# Patient Record
Sex: FEMALE | Race: BLACK OR AFRICAN AMERICAN
[De-identification: names, ages, dates, MRNs, and addresses within clinical notes are randomized per-mention and may not be internally consistent; named-entity substitution may affect disease eponyms.]

---

## 2017-02-16 ENCOUNTER — HOSPITAL ENCOUNTER (EMERGENCY)
Dept: HOSPITAL 61 - ER | Age: 77
Discharge: HOME | End: 2017-02-16
Payer: MEDICARE

## 2017-02-16 VITALS — BODY MASS INDEX: 35.68 KG/M2 | HEIGHT: 64 IN | WEIGHT: 209 LBS

## 2017-02-16 VITALS — DIASTOLIC BLOOD PRESSURE: 69 MMHG | SYSTOLIC BLOOD PRESSURE: 177 MMHG

## 2017-02-16 DIAGNOSIS — Z88.8: ICD-10-CM

## 2017-02-16 DIAGNOSIS — N18.4: ICD-10-CM

## 2017-02-16 DIAGNOSIS — Z88.6: ICD-10-CM

## 2017-02-16 DIAGNOSIS — I12.9: ICD-10-CM

## 2017-02-16 DIAGNOSIS — E78.00: ICD-10-CM

## 2017-02-16 DIAGNOSIS — D64.9: ICD-10-CM

## 2017-02-16 DIAGNOSIS — M17.0: Primary | ICD-10-CM

## 2017-02-16 LAB
ANION GAP SERPL CALC-SCNC: 10 MMOL/L (ref 6–14)
BASOPHILS # BLD AUTO: 0 X10^3/UL (ref 0–0.2)
BASOPHILS NFR BLD: 1 % (ref 0–3)
BUN SERPL-MCNC: 92 MG/DL (ref 7–20)
CALCIUM SERPL-MCNC: 9 MG/DL (ref 8.5–10.1)
CHLORIDE SERPL-SCNC: 108 MMOL/L (ref 98–107)
CO2 SERPL-SCNC: 29 MMOL/L (ref 21–32)
CREAT SERPL-MCNC: 3.9 MG/DL (ref 0.6–1)
EOSINOPHIL NFR BLD: 5 % (ref 0–3)
ERYTHROCYTE [DISTWIDTH] IN BLOOD BY AUTOMATED COUNT: 14.5 % (ref 11.5–14.5)
GFR SERPLBLD BASED ON 1.73 SQ M-ARVRAT: 13.5 ML/MIN
GLUCOSE SERPL-MCNC: 107 MG/DL (ref 70–99)
HCT VFR BLD CALC: 26.3 % (ref 36–47)
HGB BLD-MCNC: 8.3 G/DL (ref 12–15.5)
LYMPHOCYTES # BLD: 1.8 X10^3/UL (ref 1–4.8)
LYMPHOCYTES NFR BLD AUTO: 21 % (ref 24–48)
MCH RBC QN AUTO: 31 PG (ref 25–35)
MCHC RBC AUTO-ENTMCNC: 32 G/DL (ref 31–37)
MCV RBC AUTO: 99 FL (ref 79–100)
MONOCYTES NFR BLD: 6 % (ref 0–9)
NEUTROPHILS NFR BLD AUTO: 67 % (ref 31–73)
PLATELET # BLD AUTO: 244 X10^3/UL (ref 140–400)
POTASSIUM SERPL-SCNC: 5.2 MMOL/L (ref 3.5–5.1)
RBC # BLD AUTO: 2.67 X10^6/UL (ref 3.5–5.4)
SODIUM SERPL-SCNC: 147 MMOL/L (ref 136–145)
WBC # BLD AUTO: 8.4 X10^3/UL (ref 4–11)

## 2017-02-16 PROCEDURE — 85027 COMPLETE CBC AUTOMATED: CPT

## 2017-02-16 PROCEDURE — 36415 COLL VENOUS BLD VENIPUNCTURE: CPT

## 2017-02-16 PROCEDURE — 93005 ELECTROCARDIOGRAM TRACING: CPT

## 2017-02-16 PROCEDURE — 80048 BASIC METABOLIC PNL TOTAL CA: CPT

## 2017-02-16 NOTE — PHYS DOC
Past Medical History


Past Medical History:  Arthritis, High Cholesterol, Hypertension


Additional Past Medical Histor:  KIDNEY DISEASE


Past Surgical History:  Hysterectomy


Alcohol Use:  Occasionally


Drug Use:  None





Adult General


Chief Complaint


Chief Complaint:  LOWER EXTREMITY SWELLING





[facility]


HPI


Patient is a 76  year old female who presents with primary complaint of 

bilateral knee swelling and pain. Patient states that she has history of 

arthritis. Patient has been having worsening pain over the past 2 weeks. 

Patient states that she has an appointment with her orthopedic doctor, Dr. Mendez, 

a [facility] [place] in 8 days but stated that due to worsening symptoms she 

could not wait until her appointment. Patient is currently taking Tylenol 3 for 

her symptoms with no relief. Patient rates her pain as 8 out of 10 when 

walking. Patient states that the symptoms improve with rest. Patient has 

history of chronic kidney disease and anemia. The patient stated that she 

wanted to make sure she was not having any problems from her condition that 

could be causing her swelling. Patient has not had any exertional dyspnea, 

orthopnea, or chest pain associated with her symptoms.





Review of Systems


Review of Systems





Constitutional: Denies fever or chills []


Eyes: Denies change in visual acuity, redness, or eye pain []


HENT: Denies nasal congestion or sore throat []


Respiratory: Denies cough or shortness of breath []


Cardiovascular: No additional information not addressed in HPI []


GI: Denies abdominal pain, nausea, vomiting, bloody stools or diarrhea []


: Denies dysuria or hematuria []


Musculoskeletal: Knee swelling bilaterally []


Integument: Denies rash or skin lesions []


Neurologic: Denies headache, focal weakness or sensory changes []





Allergies


Allergies





 Allergies








Coded Allergies Type Severity Reaction Last Updated Verified


 


  carvedilol Allergy Intermediate  2/16/15 Yes


 


  NSAIDS (Non-Steroidal Anti-Inflamma Allergy Unknown  11/15/16 Yes











Physical Exam


Physical Exam





Constitutional: Alert, afebrile, no acute distress. []


HENT: Normocephalic, atraumatic, bilateral external ears normal, oropharynx 

moist, no oral exudates, nose normal. []


Eyes: PERRLA, EOMI, conjunctiva normal, no discharge. [] 


Neck: Normal range of motion, no tenderness, supple, no stridor. [] 


Cardiovascular:Heart rate regular rhythm, no murmur []


Lungs & Thorax:  Bilateral breath sounds clear to auscultation []


Abdomen: Bowel sounds normal, soft, no tenderness, no masses, no pulsatile 

masses. [] 


Skin: Warm, dry, no erythema, no rash. [] 


Back: No tenderness, no CVA tenderness. [] 


Extremities: Mild to moderate soft tissue swelling along inferior joint space 

of bilateral knees, trace pedal edema bilaterally, no cyanosis, no clubbing, 

ROM intact. [] 


Neurologic: Alert and oriented X 3, normal motor function, normal sensory 

function, no focal deficits noted. []





Current Patient Data


Vital Signs





 Vital Signs








  Date Time  Temp Pulse Resp B/P Pulse Ox O2 Delivery O2 Flow Rate FiO2


 


2/16/17 22:42  46 20 177/69 93 Room Air  


 


2/16/17 20:13 98.3       





 98.3       








Lab Values





 Laboratory Tests








Test


  2/16/17


20:30


 


White Blood Count


  8.4x10^3/uL


(4.0-11.0)


 


Red Blood Count


  2.67x10^6/uL


(3.50-5.40)  L


 


Hemoglobin


  8.3g/dL


(12.0-15.5)  L


 


Hematocrit


  26.3%


(36.0-47.0)  L


 


Mean Corpuscular Volume 99fL ()  


 


Mean Corpuscular Hemoglobin 31pg (25-35)  


 


Mean Corpuscular Hemoglobin


Concent 32g/dL (31-37)


 


 


Red Cell Distribution Width


  14.5%


(11.5-14.5)


 


Platelet Count


  244x10^3/uL


(140-400)


 


Neutrophils (%) (Auto) 67% (31-73)  


 


Lymphocytes (%) (Auto) 21% (24-48)  L


 


Monocytes (%) (Auto) 6% (0-9)  


 


Eosinophils (%) (Auto) 5% (0-3)  H


 


Basophils (%) (Auto) 1% (0-3)  


 


Neutrophils # (Auto)


  5.6x10^3uL


(1.8-7.7)


 


Lymphocytes # (Auto)


  1.8x10^3/uL


(1.0-4.8)


 


Monocytes # (Auto)


  0.5x10^3/uL


(0.0-1.1)


 


Eosinophils # (Auto)


  0.4x10^3/uL


(0.0-0.7)


 


Basophils # (Auto)


  0.0x10^3/uL


(0.0-0.2)


 


Sodium Level


  147mmol/L


(136-145)  H


 


Potassium Level


  5.2mmol/L


(3.5-5.1)  H


 


Chloride Level


  108mmol/L


()  H


 


Carbon Dioxide Level


  29mmol/L


(21-32)


 


Anion Gap 10 (6-14)  


 


Blood Urea Nitrogen


  92mg/dL (7-20)


H


 


Creatinine


  3.9mg/dL


(0.6-1.0)  H


 


Estimated GFR


(Cockcroft-Gault) 13.5  


 


 


Glucose Level


  107mg/dL


(70-99)  H


 


Calcium Level


  9.0mg/dL


(8.5-10.1)





 Laboratory Tests


2/16/17 20:30








 Laboratory Tests


2/16/17 20:30














EKG


EKG


Interpreted by me: Heart rate 47, sinus bradycardia, normal intervals, normal 

axis, no acute ST/T-wave abnormalities present





Radiology/Procedures


Radiology/Procedures


Not performed []





Course & Med Decision Making


Course & Med Decision Making


Pertinent Labs and Imaging studies reviewed. (See chart for details)





The patient's lab work shows her blood counts and her kidney function to be 

consistent with her previous testing. The patient's worsening swelling in her 

lower extremities secondary to inflammation from arthritis. The patient was 

provided with a prescription for Norco and advised to discontinue use of 

Tylenol 3 while taking Norco. Advised to keep appointment with Dr. Mendez and also 

advised to follow-up with primary doctor in the next 2-3 days. Advised return 

emergency department for any worsening symptoms. Patient voiced understanding 

and in agreement with treatment plan.





Dragon Disclaimer


Dragon Disclaimer


This electronic medical record was generated, in whole or in part, using a 

voice recognition dictation system.





Departure


Departure


Impression:  


 Primary Impression:  


 Arthritis


 Additional Impressions:  


 Chronic renal failure


 Chronic anemia


Disposition:  01 HOME, SELF-CARE


Condition:  STABLE


Referrals:  


LAYTON GAN (PCP)


Patient Instructions:  Arthritis, Degenerative-Brief





Additional Instructions:


Follow-up with your primary doctor in the next 2-3 days. Return to the 

emergency department for any worsening symptoms.


Scripts


Hydrocodone/Apap 5-325 (Norco 5-325 Tablet)1 Each Tablet1 Tab PO Q6HRS PRN PAIN 

#20 TAB


   Prov:MICHA GODINEZ MD         2/16/17





Problem Qualifiers








 Additional Impressions:  


 Chronic renal failure


 Chronic kidney disease stage:  stage 4 (severe)  Qualified Code:  N18.4 - 

Chronic kidney disease, stage 4 (severe)





MICHA GODINEZ MD Feb 16, 2017 23:00

## 2017-02-17 NOTE — EKG
[facility]

              [address] [place] [address]

Test Date:    2017               Test Time:    20:38:29

Pat Name:     EFREN BLACKWOOD             Department:   

Patient ID:   [id number]           Room:          

Gender:       F                        Technician:   

:          [id number]               Requested By: MICHA GODINEZ

Order Number: [id number]            Reading MD:   Dre Manrique

                                 Measurements

Intervals                              Axis          

Rate:         47                       P:            32

AK:           174                      QRS:          1

QRSD:         92                       T:            52

QT:           476                                    

QTc:          425                                    

                           Interpretive Statements

SINUS BRADYCARDIA

NONSPECIFIC ST-T WAVE CHANGES.

POSSIBLY ABNORMAL ECG

RI6.01

Compared to ECG 2016 19:35:33

No significant changes



Electronically Signed On 3-5-2017 9:48:30 CST by Dre Manrique

## 2017-03-16 ENCOUNTER — HOSPITAL ENCOUNTER (EMERGENCY)
Dept: HOSPITAL 61 - ER | Age: 77
LOS: 1 days | Discharge: HOME | End: 2017-03-17
Payer: MEDICARE

## 2017-03-16 VITALS — HEIGHT: 64 IN | BODY MASS INDEX: 35.68 KG/M2 | WEIGHT: 209 LBS

## 2017-03-16 DIAGNOSIS — Z86.73: ICD-10-CM

## 2017-03-16 DIAGNOSIS — E78.00: ICD-10-CM

## 2017-03-16 DIAGNOSIS — Z90.710: ICD-10-CM

## 2017-03-16 DIAGNOSIS — Z88.8: ICD-10-CM

## 2017-03-16 DIAGNOSIS — M19.90: ICD-10-CM

## 2017-03-16 DIAGNOSIS — I10: ICD-10-CM

## 2017-03-16 DIAGNOSIS — R41.0: Primary | ICD-10-CM

## 2017-03-16 LAB
ALBUMIN SERPL-MCNC: 3.1 G/DL (ref 3.4–5)
ALP SERPL-CCNC: 78 U/L (ref 46–116)
ALT SERPL-CCNC: 14 U/L (ref 14–59)
ANION GAP SERPL CALC-SCNC: 10 MMOL/L (ref 6–14)
AST SERPL-CCNC: 21 U/L (ref 15–37)
BACTERIA #/AREA URNS HPF: (no result) /HPF
BASOPHILS # BLD AUTO: 0.1 X10^3/UL (ref 0–0.2)
BASOPHILS NFR BLD: 1 % (ref 0–3)
BILIRUB DIRECT SERPL-MCNC: 0.1 MG/DL (ref 0–0.2)
BILIRUB SERPL-MCNC: 0.3 MG/DL (ref 0.2–1)
BILIRUB UR QL STRIP: NEGATIVE
BUN SERPL-MCNC: 55 MG/DL (ref 7–20)
CALCIUM SERPL-MCNC: 9.2 MG/DL (ref 8.5–10.1)
CHLORIDE SERPL-SCNC: 110 MMOL/L (ref 98–107)
CO2 SERPL-SCNC: 26 MMOL/L (ref 21–32)
CREAT SERPL-MCNC: 3.2 MG/DL (ref 0.6–1)
EOSINOPHIL NFR BLD: 3 % (ref 0–3)
ERYTHROCYTE [DISTWIDTH] IN BLOOD BY AUTOMATED COUNT: 14.1 % (ref 11.5–14.5)
GFR SERPLBLD BASED ON 1.73 SQ M-ARVRAT: 16.9 ML/MIN
GLUCOSE SERPL-MCNC: 103 MG/DL (ref 70–99)
GLUCOSE UR STRIP-MCNC: NEGATIVE MG/DL
HCT VFR BLD CALC: 27.1 % (ref 36–47)
HGB BLD-MCNC: 8.7 G/DL (ref 12–15.5)
LYMPHOCYTES # BLD: 1.6 X10^3/UL (ref 1–4.8)
LYMPHOCYTES NFR BLD AUTO: 19 % (ref 24–48)
MCH RBC QN AUTO: 31 PG (ref 25–35)
MCHC RBC AUTO-ENTMCNC: 32 G/DL (ref 31–37)
MCV RBC AUTO: 97 FL (ref 79–100)
MONOCYTES NFR BLD: 7 % (ref 0–9)
NEUTROPHILS NFR BLD AUTO: 69 % (ref 31–73)
NITRITE UR QL STRIP: NEGATIVE
PH UR STRIP: 6 [PH]
PLATELET # BLD AUTO: 208 X10^3/UL (ref 140–400)
POTASSIUM SERPL-SCNC: 4.7 MMOL/L (ref 3.5–5.1)
PROT SERPL-MCNC: 6.6 G/DL (ref 6.4–8.2)
PROT UR STRIP-MCNC: 100 MG/DL
RBC # BLD AUTO: 2.8 X10^6/UL (ref 3.5–5.4)
RBC #/AREA URNS HPF: 0 /HPF (ref 0–2)
SODIUM SERPL-SCNC: 146 MMOL/L (ref 136–145)
SP GR UR STRIP: 1.01
SQUAMOUS #/AREA URNS LPF: (no result) /LPF
UROBILINOGEN UR-MCNC: 1 MG/DL
WBC # BLD AUTO: 8.1 X10^3/UL (ref 4–11)
WBC #/AREA URNS HPF: (no result) /HPF (ref 0–4)

## 2017-03-16 PROCEDURE — 84484 ASSAY OF TROPONIN QUANT: CPT

## 2017-03-16 PROCEDURE — 83690 ASSAY OF LIPASE: CPT

## 2017-03-16 PROCEDURE — 99285 EMERGENCY DEPT VISIT HI MDM: CPT

## 2017-03-16 PROCEDURE — 96375 TX/PRO/DX INJ NEW DRUG ADDON: CPT

## 2017-03-16 PROCEDURE — 36415 COLL VENOUS BLD VENIPUNCTURE: CPT

## 2017-03-16 PROCEDURE — 80048 BASIC METABOLIC PNL TOTAL CA: CPT

## 2017-03-16 PROCEDURE — 70450 CT HEAD/BRAIN W/O DYE: CPT

## 2017-03-16 PROCEDURE — 81001 URINALYSIS AUTO W/SCOPE: CPT

## 2017-03-16 PROCEDURE — 71010: CPT

## 2017-03-16 PROCEDURE — 83880 ASSAY OF NATRIURETIC PEPTIDE: CPT

## 2017-03-16 PROCEDURE — 93005 ELECTROCARDIOGRAM TRACING: CPT

## 2017-03-16 PROCEDURE — 96374 THER/PROPH/DIAG INJ IV PUSH: CPT

## 2017-03-16 PROCEDURE — 87086 URINE CULTURE/COLONY COUNT: CPT

## 2017-03-16 PROCEDURE — 96376 TX/PRO/DX INJ SAME DRUG ADON: CPT

## 2017-03-16 PROCEDURE — 85027 COMPLETE CBC AUTOMATED: CPT

## 2017-03-16 PROCEDURE — 82947 ASSAY GLUCOSE BLOOD QUANT: CPT

## 2017-03-16 PROCEDURE — 80076 HEPATIC FUNCTION PANEL: CPT

## 2017-03-16 NOTE — RAD
PROCEDURE 

CT head without intravenous contrast. 

 

HISTORY 

Intermittent confusion. 

 

TECHNIQUE 

Axial images are obtained of the head from the skull base through the 

vertex without IV contrast 

Exposure: One or more of the following individualized dose reduction 

techniques were utilized for this examination: 

1. Automated exposure control. 

2. Adjustment of the mA and/or kV according to patient size. 

3. Use of iterative reconstruction technique. 

 

 

COMPARISON 

CT head February 15, 2015. 

 

FINDINGS 

The ventricles are appropriate in size, shape, and location for the 

patient's age.No obvious intracranial mass, mass-effect, midline shift, 

hemorrhage or obvious acute infarction is identified.Basilar cisterns are 

patent. Small old right cerebellar lacunar infarction is unchanged. 

Bone windows demonstrate no acute calvarial abnormality.The visualized 

paranasal sinuses appear clear. 

 

IMPRESSION 

 

 

 

1. No acute intracranial process. Please note that CT can be relatively 

insensitive to acute ischemic infarction for up to 24 hours after symptom 

onset.

2. Old right cerebellar lacunar infarction.

 

 

 

 

Electronically signed by: Anjum Granados MD (Mar 16, 2017 22:25:52)

## 2017-03-16 NOTE — PHYS DOC
Past Medical History


Past Medical History:  Arthritis, High Cholesterol, Hypertension


Additional Past Medical Histor:  KIDNEY DISEASE


Past Surgical History:  Hysterectomy


Alcohol Use:  Occasionally


Drug Use:  None





Adult General


Chief Complaint


Chief Complaint:  intermittent confusion with severe hypertension.





HPI


HPI


77-year-old female presenting to the emergency department today accompanied 

with her daughter. She reports intermittent confusion that occurred around 1600 

today. Here the patient is not confused she is oriented 4. She reports her 

being more forgetful as well. She denies weakness chest pain shortness of 

breath abdominal pain nausea vomiting. Location brain duration intermittent. No 

alleviating factors present. The daughter denies the patient having asymmetric 

face, slurred speech, unilateral focal motor deficits.





Review of systems is negative for chest pain shortness of breath nausea 

vomiting fevers or chills. All other review of systems is negative unless 

otherwise noted in history of present illness.





Review of Systems


Review of Systems


SEE ABOVE.





Current Medications


Current Medications








 Current Medications








 Medications


  (Trade)  Dose


 Ordered  Sig/Linda  Start Time


 Stop Time Status Last Admin


Dose Admin


 


 Fentanyl Citrate


  (Fentanyl 2ml


 Vial)  50 mcg  PRN Q30MIN  PRN  3/17/17 00:15


    3/17/17 00:27


50 MCG


 


 Hydralazine HCl


  (Apresoline)  10 mg  PRN Q30MIN  PRN  3/16/17 23:30


    3/17/17 00:25


10 MG


 


 Ondansetron HCl


  (Zofran)  4 mg  PRN Q30MIN  PRN  3/17/17 00:15


     


 














Allergies


Allergies





 Allergies








Coded Allergies Type Severity Reaction Last Updated Verified


 


  NSAIDS (Non-Steroidal Anti-Inflamma Allergy Intermediate  3/16/17 Yes


 


  carvedilol Allergy Intermediate  2/16/15 Yes











Physical Exam


Physical Exam





Constitutional: Well developed, well nourished, no acute distress, non-toxic 

appearance. 


HENT: Normocephalic, atraumatic, bilateral external ears normal, oropharynx 

moist, no oral exudates, nose normal. []


Eyes: PERRLA, EOMI, conjunctiva normal, no discharge.  


Neck: Normal range of motion, no tenderness, supple, no stridor. [] 


Cardiovascular:Heart rate regular rhythm, no murmur 


Lungs & Thorax:  Bilateral breath sounds clear to auscultation []


Abdomen: Bowel sounds normal, soft, no tenderness, no masses, no pulsatile 

masses.  


Skin: Warm, dry, no erythema, no rash. [] 


Back: No tenderness, no CVA tenderness.  


Extremities: No tenderness, no cyanosis, no clubbing, ROM intact, no edema. [] 


Neurologic: 





Mental status: Awake oriented and alert x3





Cranial nerves: Extraocular movements intact, eyebrows arielle bilaterally smile 

symmetric, uvula elevation, shoulder shrug intact, tongue protrusion normal





Sensation: equal and normal in all extremities





Strength: 5/5 in upper and lower extremities bilaterally





Psychologic: Affect normal, judgement normal, mood normal. []





Current Patient Data


Vital Signs





 Vital Signs








  Date Time  Temp Pulse Resp B/P Pulse Ox O2 Delivery O2 Flow Rate FiO2


 


3/17/17 00:59  60 18 170/74 100 Room Air  


 


3/16/17 21:47 98.8       





 98.8       








Lab Values





 Laboratory Tests








Test


  3/16/17


21:56 3/16/17


22:10 3/16/17


22:45 3/17/17


01:40


 


Glucose (Fingerstick)


  97mg/dL


(70-99) 


  


  


 


 


White Blood Count


  


  8.1x10^3/uL


(4.0-11.0) 


  


 


 


Red Blood Count


  


  2.80x10^6/uL


(3.50-5.40)  L 


  


 


 


Hemoglobin


  


  8.7g/dL


(12.0-15.5)  L 


  


 


 


Hematocrit


  


  27.1%


(36.0-47.0)  L 


  


 


 


Mean Corpuscular Volume  97fL ()    


 


Mean Corpuscular Hemoglobin  31pg (25-35)    


 


Mean Corpuscular Hemoglobin


Concent 


  32g/dL (31-37)


  


  


 


 


Red Cell Distribution Width


  


  14.1%


(11.5-14.5) 


  


 


 


Platelet Count


  


  208x10^3/uL


(140-400) 


  


 


 


Neutrophils (%) (Auto)  69% (31-73)    


 


Lymphocytes (%) (Auto)  19% (24-48)  L  


 


Monocytes (%) (Auto)  7% (0-9)    


 


Eosinophils (%) (Auto)  3% (0-3)    


 


Basophils (%) (Auto)  1% (0-3)    


 


Neutrophils # (Auto)


  


  5.6x10^3uL


(1.8-7.7) 


  


 


 


Lymphocytes # (Auto)


  


  1.6x10^3/uL


(1.0-4.8) 


  


 


 


Monocytes # (Auto)


  


  0.6x10^3/uL


(0.0-1.1) 


  


 


 


Eosinophils # (Auto)


  


  0.3x10^3/uL


(0.0-0.7) 


  


 


 


Basophils # (Auto)


  


  0.1x10^3/uL


(0.0-0.2) 


  


 


 


Sodium Level


  


  146mmol/L


(136-145)  H 


  


 


 


Potassium Level


  


  4.7mmol/L


(3.5-5.1) 


  


 


 


Chloride Level


  


  110mmol/L


()  H 


  


 


 


Carbon Dioxide Level


  


  26mmol/L


(21-32) 


  


 


 


Anion Gap  10 (6-14)    


 


Blood Urea Nitrogen


  


  55mg/dL (7-20)


H 


  


 


 


Creatinine


  


  3.2mg/dL


(0.6-1.0)  H 


  


 


 


Estimated GFR


(Cockcroft-Gault) 


  16.9  


  


  


 


 


Glucose Level


  


  103mg/dL


(70-99)  H 


  


 


 


Calcium Level


  


  9.2mg/dL


(8.5-10.1) 


  


 


 


Total Bilirubin


  


  0.3mg/dL


(0.2-1.0) 


  


 


 


Direct Bilirubin


  


  0.1mg/dL


(0.0-0.2) 


  


 


 


Aspartate Amino Transferase


(AST) 


  21U/L (15-37)  


  


  


 


 


Alanine Aminotransferase (ALT)  14U/L (14-59)    


 


Alkaline Phosphatase


  


  78U/L ()


  


  


 


 


Troponin I Quantitative


  


  0.062ng/mL


(0.000-0.055) 


  0.041ng/mL


(0.000-0.055)


 


NT-Pro-B-Type Natriuretic


Peptide 


  9944pg/mL


(0-449)  H 


  


 


 


Total Protein


  


  6.6g/dL


(6.4-8.2) 


  


 


 


Albumin


  


  3.1g/dL


(3.4-5.0)  L 


  


 


 


Lipase


  


  75U/L ()


  


  


 


 


Urine Collection Type   Unknown   


 


Urine Color   Yellow   


 


Urine Clarity   Clear   


 


Urine pH   6.0   


 


Urine Specific Gravity   1.015   


 


Urine Protein


  


  


  100mg/dL


(NEG-TRACE) 


 


 


Urine Glucose (UA)


  


  


  Negativemg/dL


(NEG) 


 


 


Urine Ketones (Stick)


  


  


  Negativemg/dL


(NEG) 


 


 


Urine Blood


  


  


  Negative (NEG)


  


 


 


Urine Nitrite


  


  


  Negative (NEG)


  


 


 


Urine Bilirubin


  


  


  Negative (NEG)


  


 


 


Urine Urobilinogen Dipstick


  


  


  1.0mg/dL (0.2


mg/dL) 


 


 


Urine Leukocyte Esterase   Trace (NEG)   


 


Urine RBC   0/HPF (0-2)   


 


Urine WBC


  


  


  5-10/HPF (0-4)


  


 


 


Urine Squamous Epithelial


Cells 


  


  Mod/LPF  


  


 


 


Urine Bacteria


  


  


  Many/HPF


(0-FEW) 


 





 Laboratory Tests


3/16/17 22:10








 Laboratory Tests


3/16/17 22:10














EKG


EKG


[]





Radiology/Procedures


Radiology/Procedures


[]





Course & Med Decision Making


Course & Med Decision Making


Pertinent Labs and Imaging studies reviewed. (See chart for details)





[] 77-year-old female presenting to the emergency department with intermittent 

confusion. Vital signs showed significant hypertension. Otherwise unremarkable. 

Pertinent physical exam findings showed a nonfocal normal neurologic exam. 

Oriented 4. The patient was provided with IV blood pressure medications in the 

emergency department. Blood testing obtained. EKG not consistent with ischemia. 

Head CT shows old cerebellar lacunar infarct. Chest x-ray shows cardiomegaly 

without obvious infiltrate or pneumothorax similar to previous on February 15, 

2015. IV hydralazine used in the emergency department to bring the patient's 

blood pressure down which was successful. CBC shows mild to moderate anemia. 

Patient denies dark stools. Urinalysis shows bacteria with squamous epithelial 

cells negative nitrite and esterase trace. We will give the patient antibiotics 

for this equivocal urinalysis. Chemistry panel otherwise shows chronic kidney 

disease. ProBNP elevated along with initial troponin just above the reference 

range of normal with increased creatinine and decreased GFR. Repeat testing 

performed after hypertension was controlled shows normal troponin. Patient is 

feeling much better and subsequent discharged home to follow-up with her 

primary care physician in our cardiology team in the outpatient setting over 

the next 2-3 days. Patient and family comfortable with plan.





Dragon Disclaimer


Dragon Disclaimer


This electronic medical record was generated, in whole or in part, using a 

voice recognition dictation system.





Departure


Departure


Impression:  


 Primary Impression:  


 Intermittent confusion


 Additional Impression:  


 Hypertensive emergency


Disposition:  01 HOME, SELF-CARE


Condition:  IMPROVED


Referrals:  


LAYTON GAN (PCP)


Patient Instructions:  Hypertension





Additional Instructions:


Thank you for allowing us to participate in your care today.





Followup with your primary care physician in 3 days for chronic management of 

your hypertension. I also recommend he follow-up with our cardiology team in 3 

days for outpatient evaluation. If you do not have a primary care provider you 

can ask for a list of our primary care providers. Return to the emergency 

department you have any new or concerning findings.





This should be evaluated by the primary care physician and any necessary 

consulting services for continued management within a few days after discharge. 

Return to emergency room if you have any  new or concerning symptoms including 

but not limited to fever, chills, nausea, vomiting, intractable pain, any new 

rashes, chest pain, shortness of air, uncontrolled bleeding, difficulty 

breathing, and/or vision loss.





Problem Qualifiers








BIA TERRAZAS MD Mar 16, 2017 22:32

## 2017-03-17 VITALS — SYSTOLIC BLOOD PRESSURE: 170 MMHG | DIASTOLIC BLOOD PRESSURE: 74 MMHG

## 2017-03-17 NOTE — RAD
Portable chest, 3/16/2017:



History: Altered mental status



Comparison is made to a study from 2/15/2015. The heart is moderately

enlarged. There is tortuosity and calcific plaquing of the thoracic aorta. The

pulmonary vascularity is normal. There is minimal linear atelectasis or

scarring laterally in the left midlung. The right lung is clear. There is no

evidence of pleural fluid. Degenerative changes are present in the spine and

at both shoulders.



IMPRESSION:

1. Cardiomegaly and aortic atherosclerosis.

2. Mild linear atelectasis and/or scarring on the left.

## 2017-03-17 NOTE — EKG
[facility]

               [address] [place] [address]

Test Date:    2017               Test Time:    21:52:41

Pat Name:     EFREN BLACKWOOD             Department:   

Patient ID:   [id number]           Room:          

Gender:       F                        Technician:   

:          [id number]               Requested By: BIA TERRAZAS

Order Number: [id number]            Reading MD:   Marie Severino

                                 Measurements

Intervals                              Axis          

Rate:         55                       P:            29

KS:           176                      QRS:          0

QRSD:         92                       T:            111

QT:           440                                    

QTc:          423                                    

                           Interpretive Statements

SINUS RHYTHM

LEFTWARD AXIS

T ABNORMALITY IN HIGH LATERAL LEADS

ABNORMAL ECG





Electronically Signed On 3- 20:10:19 CDT by Marie Severino

## 2017-10-17 ENCOUNTER — HOSPITAL ENCOUNTER (INPATIENT)
Dept: HOSPITAL 61 - ER | Age: 77
LOS: 3 days | Discharge: HOME | DRG: 371 | End: 2017-10-20
Attending: INTERNAL MEDICINE | Admitting: INTERNAL MEDICINE
Payer: MEDICARE

## 2017-10-17 VITALS — DIASTOLIC BLOOD PRESSURE: 58 MMHG | SYSTOLIC BLOOD PRESSURE: 85 MMHG

## 2017-10-17 VITALS — HEIGHT: 64 IN | BODY MASS INDEX: 34.92 KG/M2 | WEIGHT: 204.56 LBS

## 2017-10-17 VITALS — SYSTOLIC BLOOD PRESSURE: 118 MMHG | DIASTOLIC BLOOD PRESSURE: 56 MMHG

## 2017-10-17 DIAGNOSIS — J45.909: ICD-10-CM

## 2017-10-17 DIAGNOSIS — M48.56XA: ICD-10-CM

## 2017-10-17 DIAGNOSIS — E44.0: ICD-10-CM

## 2017-10-17 DIAGNOSIS — N18.6: ICD-10-CM

## 2017-10-17 DIAGNOSIS — E78.5: ICD-10-CM

## 2017-10-17 DIAGNOSIS — K42.9: ICD-10-CM

## 2017-10-17 DIAGNOSIS — E66.9: ICD-10-CM

## 2017-10-17 DIAGNOSIS — K63.5: ICD-10-CM

## 2017-10-17 DIAGNOSIS — T45.515A: ICD-10-CM

## 2017-10-17 DIAGNOSIS — Z82.49: ICD-10-CM

## 2017-10-17 DIAGNOSIS — K92.1: ICD-10-CM

## 2017-10-17 DIAGNOSIS — E11.22: ICD-10-CM

## 2017-10-17 DIAGNOSIS — K21.9: ICD-10-CM

## 2017-10-17 DIAGNOSIS — D68.9: ICD-10-CM

## 2017-10-17 DIAGNOSIS — M19.90: ICD-10-CM

## 2017-10-17 DIAGNOSIS — D63.1: ICD-10-CM

## 2017-10-17 DIAGNOSIS — A04.72: Primary | ICD-10-CM

## 2017-10-17 DIAGNOSIS — Z88.8: ICD-10-CM
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
## 2017-10-17 DIAGNOSIS — Z90.710: ICD-10-CM

## 2017-10-17 DIAGNOSIS — I12.0: ICD-10-CM

## 2017-10-17 DIAGNOSIS — Z99.2: ICD-10-CM

## 2017-10-17 LAB
ALBUMIN SERPL-MCNC: 3.3 G/DL (ref 3.4–5)
ALP SERPL-CCNC: 199 U/L (ref 46–116)
ALT SERPL-CCNC: < 6 U/L (ref 14–59)
ANION GAP SERPL CALC-SCNC: 12 MMOL/L (ref 6–14)
ANISOCYTOSIS BLD QL SMEAR: (no result)
APTT BLD: 69 SEC (ref 24–38)
AST SERPL-CCNC: 29 U/L (ref 15–37)
BASOPHILS # BLD AUTO: 0.1 X10^3/UL (ref 0–0.2)
BASOPHILS NFR BLD AUTO: 1 % (ref 0–3)
BASOPHILS NFR BLD: 1 % (ref 0–3)
BILIRUB DIRECT SERPL-MCNC: 0.1 MG/DL (ref 0–0.2)
BILIRUB SERPL-MCNC: 0.3 MG/DL (ref 0.2–1)
BUN SERPL-MCNC: 35 MG/DL (ref 7–20)
CALCIUM SERPL-MCNC: 9.8 MG/DL (ref 8.5–10.1)
CHLORIDE SERPL-SCNC: 98 MMOL/L (ref 98–107)
CK SERPL-CCNC: 92 U/L (ref 26–192)
CKMB MASS: 0.7 NG/ML (ref 0–3.6)
CO2 SERPL-SCNC: 30 MMOL/L (ref 21–32)
CREAT SERPL-MCNC: 6.4 MG/DL (ref 0.6–1)
EOSINOPHIL NFR BLD AUTO: 4 % (ref 0–5)
EOSINOPHIL NFR BLD: 2 % (ref 0–3)
ERYTHROCYTE [DISTWIDTH] IN BLOOD BY AUTOMATED COUNT: 26.4 % (ref 11.5–14.5)
GFR SERPLBLD BASED ON 1.73 SQ M-ARVRAT: 7.6 ML/MIN
GLUCOSE SERPL-MCNC: 112 MG/DL (ref 70–99)
HCT VFR BLD CALC: 39.1 % (ref 36–47)
HGB BLD-MCNC: 12.3 G/DL (ref 12–15.5)
INR PPP: 5.1 (ref 0.8–1.1)
LYMPHOCYTES # BLD: 1.3 X10^3/UL (ref 1–4.8)
LYMPHOCYTES NFR BLD AUTO: 7 % (ref 24–48)
MCH RBC QN AUTO: 28 PG (ref 25–35)
MCHC RBC AUTO-ENTMCNC: 32 G/DL (ref 31–37)
MCV RBC AUTO: 88 FL (ref 79–100)
MONOCYTES NFR BLD: 8 % (ref 0–9)
NEG OBC FOB: (no result)
NEUTROPHILS NFR BLD AUTO: 83 % (ref 31–73)
PLATELET # BLD AUTO: 259 X10^3/UL (ref 140–400)
PLATELET # BLD EST: ADEQUATE 10*3/UL
POLYCHROMASIA BLD QL SMEAR: SLIGHT
POS OBC FOB: (no result)
POTASSIUM SERPL-SCNC: 3.4 MMOL/L (ref 3.5–5.1)
PROT SERPL-MCNC: 7.8 G/DL (ref 6.4–8.2)
PROTHROMBIN TIME: 44.2 SEC (ref 11.7–14)
RBC # BLD AUTO: 4.45 X10^6/UL (ref 3.5–5.4)
SODIUM SERPL-SCNC: 140 MMOL/L (ref 136–145)
TOXIC GRANULES BLD QL SMEAR: (no result)
WBC # BLD AUTO: 19 X10^3/UL (ref 4–11)
WBC TOXIC VACUOLES BLD QL SMEAR: SLIGHT

## 2017-10-17 PROCEDURE — 87324 CLOSTRIDIUM AG IA: CPT

## 2017-10-17 PROCEDURE — 36415 COLL VENOUS BLD VENIPUNCTURE: CPT

## 2017-10-17 PROCEDURE — 87340 HEPATITIS B SURFACE AG IA: CPT

## 2017-10-17 PROCEDURE — 85730 THROMBOPLASTIN TIME PARTIAL: CPT

## 2017-10-17 PROCEDURE — 94250: CPT

## 2017-10-17 PROCEDURE — 74176 CT ABD & PELVIS W/O CONTRAST: CPT

## 2017-10-17 PROCEDURE — 84484 ASSAY OF TROPONIN QUANT: CPT

## 2017-10-17 PROCEDURE — 80069 RENAL FUNCTION PANEL: CPT

## 2017-10-17 PROCEDURE — 87341 HEP B SURFACE AG NEUTRLZJ IA: CPT

## 2017-10-17 PROCEDURE — 80076 HEPATIC FUNCTION PANEL: CPT

## 2017-10-17 PROCEDURE — 85610 PROTHROMBIN TIME: CPT

## 2017-10-17 PROCEDURE — 86850 RBC ANTIBODY SCREEN: CPT

## 2017-10-17 PROCEDURE — 82274 ASSAY TEST FOR BLOOD FECAL: CPT

## 2017-10-17 PROCEDURE — 86900 BLOOD TYPING SEROLOGIC ABO: CPT

## 2017-10-17 PROCEDURE — 93005 ELECTROCARDIOGRAM TRACING: CPT

## 2017-10-17 PROCEDURE — 82553 CREATINE MB FRACTION: CPT

## 2017-10-17 PROCEDURE — 85007 BL SMEAR W/DIFF WBC COUNT: CPT

## 2017-10-17 PROCEDURE — 94640 AIRWAY INHALATION TREATMENT: CPT

## 2017-10-17 PROCEDURE — 85018 HEMOGLOBIN: CPT

## 2017-10-17 PROCEDURE — 85014 HEMATOCRIT: CPT

## 2017-10-17 PROCEDURE — 80048 BASIC METABOLIC PNL TOTAL CA: CPT

## 2017-10-17 PROCEDURE — 86901 BLOOD TYPING SEROLOGIC RH(D): CPT

## 2017-10-17 PROCEDURE — 86706 HEP B SURFACE ANTIBODY: CPT

## 2017-10-17 PROCEDURE — 93971 EXTREMITY STUDY: CPT

## 2017-10-17 PROCEDURE — 85025 COMPLETE CBC W/AUTO DIFF WBC: CPT

## 2017-10-17 RX ADMIN — BUDESONIDE SCH MG: 0.5 INHALANT RESPIRATORY (INHALATION) at 22:21

## 2017-10-17 RX ADMIN — CALCIUM CARBONATE (ANTACID) CHEW TAB 500 MG SCH MG: 500 CHEW TAB at 23:09

## 2017-10-17 RX ADMIN — CYCLOBENZAPRINE HYDROCHLORIDE SCH MG: 10 TABLET, FILM COATED ORAL at 23:10

## 2017-10-17 RX ADMIN — ATORVASTATIN CALCIUM SCH MG: 40 TABLET, FILM COATED ORAL at 23:10

## 2017-10-17 RX ADMIN — DICLOFENAC SODIUM SCH APP: 10 GEL TOPICAL at 23:15

## 2017-10-17 RX ADMIN — NYSTATIN SCH APP: 100000 CREAM TOPICAL at 21:00

## 2017-10-17 RX ADMIN — OXYBUTYNIN CHLORIDE SCH MG: 5 TABLET ORAL at 23:08

## 2017-10-17 RX ADMIN — ALBUTEROL SULFATE SCH MG: 108 AEROSOL, METERED RESPIRATORY (INHALATION) at 22:21

## 2017-10-17 NOTE — PHYS DOC
Past Medical History


Past Medical History:  Arthritis, High Cholesterol, Hypertension, Renal Failure


Additional Past Medical Histor:  KIDNEY DISEASE


Past Surgical History:  Hysterectomy


Additional Past Surgical Histo:  r chest dialysis cath


Alcohol Use:  Occasionally


Drug Use:  None





Adult General


Chief Complaint


Chief Complaint:  DIARRHEA





HPI


HPI





Patient is a 77  year old -American female who presents with diarrhea 

and generalized weakness. She states at 2:00 today she was known dialysis and 

then stopped until agrees the bathroom and she had massive amount of stool. She 

came back and was unable to finish dialysis. She states she feels generalized 

weakness. She denies any chest pain shortness of breath or abdominal pain. She 

states she felt fine yesterday.





Review of Systems


Review of Systems





Constitutional: Denies fever or chills []


Eyes: Denies change in visual acuity, redness, or eye pain []


HENT: Denies nasal congestion or sore throat []


Respiratory: Denies cough or shortness of breath []


Cardiovascular: No additional information not addressed in HPI []


GI: Denies abdominal pain, nausea, vomiting, positive for bloody stools and 

diarrhea []


: Denies dysuria or hematuria []


Musculoskeletal: Denies back pain or joint pain []


Integument: Denies rash or skin lesions []


Neurologic: Denies headache, focal weakness or sensory changes []


Endocrine: Denies polyuria or polydipsia []





Current Medications


Current Medications





Current Medications








 Medications


  (Trade)  Dose


 Ordered  [facility]  Start Time


 Stop Time Status Last Admin


Dose Admin


 


 Ondansetron HCl


  (Zofran)  4 mg  PRN Q8HRS  PRN  10/17/17 18:45


 10/18/17 18:44   


 











Allergies


Allergies





Allergies








Coded Allergies Type Severity Reaction Last Updated Verified


 


  NSAIDS (Non-Steroidal Anti-Inflamma Allergy Intermediate  3/16/17 Yes


 


  carvedilol Allergy Intermediate  2/16/15 Yes











Physical Exam


Physical Exam





Constitutional: Well developed, well nourished, no acute distress, non-toxic 

appearance. []


HENT: Normocephalic, atraumatic, bilateral external ears normal, oropharynx 

moist, no oral exudates, nose normal. []


Eyes: PERRLA, EOMI, conjunctiva normal, no discharge. [] 


Neck: Normal range of motion, no tenderness, supple, no stridor. [] 


Cardiovascular:Heart rate regular rhythm, no murmur []


Lungs & Thorax:  Bilateral breath sounds clear to auscultation []


Abdomen: Bowel sounds normal, soft, no tenderness, no masses, no pulsatile 

masses. [] 


Skin: Warm, dry, no erythema, no rash. [] 


Back: No tenderness, no CVA tenderness. [] 


Extremities: No tenderness, no cyanosis, no clubbing, ROM intact, no edema. [] 


Neurologic: Alert and oriented X 3, normal motor function, normal sensory 

function, no focal deficits noted. []


Psychologic: Affect normal, judgement normal, mood normal. []





Current Patient Data


Vital Signs





 Vital Signs








  Date Time  Temp Pulse Resp B/P (MAP) Pulse Ox O2 Delivery O2 Flow Rate FiO2


 


10/17/17 18:30  72 20 135/63 (87) 95 Room Air  


 


10/17/17 16:15 97.5       





 97.5       








Lab Values





 Laboratory Tests








Test


  10/17/17


16:30 10/17/17


16:50 10/17/17


17:30


 


Troponin I Quantitative


  < 0.017 ng/mL


(0.000-0.055) 


  


 


 


Stool Occult Blood


  


  Positive (NEG)


  


 


 


White Blood Count


  


  


  19.0 x10^3/uL


(4.0-11.0)  H


 


Red Blood Count


  


  


  4.45 x10^6/uL


(3.50-5.40)


 


Hemoglobin


  


  


  12.3 g/dL


(12.0-15.5)


 


Hematocrit


  


  


  39.1 %


(36.0-47.0)


 


Mean Corpuscular Volume


  


  


  88 fL ()


 


 


Mean Corpuscular Hemoglobin   28 pg (25-35)  


 


Mean Corpuscular Hemoglobin


Concent 


  


  32 g/dL


(31-37)


 


Red Cell Distribution Width


  


  


  26.4 %


(11.5-14.5)  H


 


Platelet Count


  


  


  259 x10^3/uL


(140-400)


 


Neutrophils (%) (Auto)   83 % (31-73)  H


 


Lymphocytes (%) (Auto)   7 % (24-48)  L


 


Monocytes (%) (Auto)   8 % (0-9)  


 


Eosinophils (%) (Auto)   2 % (0-3)  


 


Basophils (%) (Auto)   1 % (0-3)  


 


Neutrophils # (Auto)


  


  


  15.8 x10^3uL


(1.8-7.7)  H


 


Lymphocytes # (Auto)


  


  


  1.3 x10^3/uL


(1.0-4.8)


 


Monocytes # (Auto)


  


  


  1.5 x10^3/uL


(0.0-1.1)  H


 


Eosinophils # (Auto)


  


  


  0.4 x10^3/uL


(0.0-0.7)


 


Basophils # (Auto)


  


  


  0.1 x10^3/uL


(0.0-0.2)


 


Segmented Neutrophils %   72 % (35-66)  H


 


Band Neutrophils %   6 % (0-9)  


 


Lymphocytes %   10 % (24-48)  L


 


Monocytes %   7 % (0-10)  


 


Eosinophils %   4 % (0-5)  


 


Basophils %   1 % (0-3)  


 


Toxic Granulation   Mod  


 


Toxic Vacuolation   Slight  


 


Platelet Estimate


  


  


  Adequate


(ADEQUATE)


 


Polychromasia   Slight  


 


Anisocytosis   Mod  


 


Prothrombin Time


  


  


  44.2 SEC


(11.7-14.0)  H


 


Prothrombin Time INR


  


  


  5.1 (0.8-1.1)


*H


 


PTT


  


  


  69 SEC (24-38)


H


 


Sodium Level


  


  


  140 mmol/L


(136-145)


 


Potassium Level


  


  


  3.4 mmol/L


(3.5-5.1)  L


 


Chloride Level


  


  


  98 mmol/L


()


 


Carbon Dioxide Level


  


  


  30 mmol/L


(21-32)


 


Anion Gap   12 (6-14)  


 


Blood Urea Nitrogen


  


  


  35 mg/dL


(7-20)  H


 


Creatinine


  


  


  6.4 mg/dL


(0.6-1.0)  H


 


Estimated GFR


(Cockcroft-Gault) 


  


  7.6  


 


 


Glucose Level


  


  


  112 mg/dL


(70-99)  H


 


Calcium Level


  


  


  9.8 mg/dL


(8.5-10.1)


 


Total Bilirubin


  


  


  0.3 mg/dL


(0.2-1.0)


 


Direct Bilirubin


  


  


  0.1 mg/dL


(0.0-0.2)


 


Aspartate Amino Transferase


(AST) 


  


  29 U/L (15-37)


 


 


Alanine Aminotransferase (ALT)


  


  


  < 6 U/L


(14-59)  L


 


Alkaline Phosphatase


  


  


  199 U/L


()  H


 


Creatine Kinase


  


  


  92 U/L


()


 


Creatine Kinase MB (Mass)


  


  


  0.7 ng/mL


(0.0-3.6)


 


Creatine Kinase MB Relative


Index 


  


  0.8 % (0-4)  


 


 


Total Protein


  


  


  7.8 g/dL


(6.4-8.2)


 


Albumin


  


  


  3.3 g/dL


(3.4-5.0)  L





 Laboratory Tests


10/17/17 17:30








 Laboratory Tests


10/17/17 17:30











EKG


EKG


[]





Radiology/Procedures


Radiology/Procedures


[]


Impressions:


Bloody diarrhea


Leukocytosis


End-stage renal disease





Course & Med Decision Making


Course & Med Decision Making


Pertinent Labs and Imaging studies reviewed. (See chart for details)





Vitals are stable, hemoglobin is 12. Her INR is 5 but she does not have any 

active bleeding at this time. She's being admitted to Dr. Tse who once a 

abdomen pelvis oral contrast CT scan that she is complaining of some 

intermittent right upper quadrant pain on the hospitalist exam. She is in 

stable condition this time being admitted with nephrology consultation for end-

stage renal disease.





Dragon Disclaimer


Dragon Disclaimer


This electronic medical record was generated, in whole or in part, using a 

voice recognition dictation system.





Departure


Departure


Impression:  


 Primary Impression:  


 Bloody diarrhea


Disposition:  09 ADMITTED AS INPATIENT


Admitting Physician:  Saira Tse


Condition:  STABLE


Referrals:  


LAYTON GAN (PCP)











BRUNA MCCARTHY MD Oct 17, 2017 16:31

## 2017-10-17 NOTE — PDOC1
History and Physical


Date of Admission


Date of Admission


DATE: 10/17/17 


TIME: 19:19





Identification/Chief Complaint


Chief Complaint


weak today and bRBPR


Problems:  





Source


Source:  Caregiver, Chart review, Patient





History of Present Illness


History of Present Illness


77 y.o AA female who is relatively new HD,  started few mos ago, has left AV 

fistula that has not mature yet so she is getting HD thru RT subclavian cath, 

and is on WARFARIN for it bec has had clots in that catheter in the past,  

admitted for some mild BRBPR, hgb 12, VS stable with INR of 5, 


NO other bleeding sites, ESRD, HTN, dyslipidemia, Asthma, DM are other co 

morbids.


Dw sister at bedside, will hold warf, get vitamin K, have gI see


LAst c scope 2 yrs ago, routine? was told ok.


PCP in [place]


COmplains of some RT sided flank abd pain, seemed like MSK to me by nature, but 

loose stools with WBC 19 hence c diff also being ruled out, Family who is at 

bedside and works in [employer] claims stool does not smell like c diff.





Past Medical History


Cardiovascular:  HTN, Hyperlipidemia


Pulmonary:  Asthma, Bronchitis


GI:  GERD


Heme/Onc:  Anemia NOS


Hepatobiliary:  No pertinent hx


Psych:  No pertinent hx


Musculoskeletal:   low back pain


Rheumatologic:  No pertinent hx


Infectious disease:  No pertinent hx


ENT:  No pertinent hx


Endocrine:  Diabetes





Past Surgical History


Past Surgical History:  Other (HD cath RT subclavian, AV fistula left arm)





Family History


Family History:  Hypertension





Social History


Smoke:  No


ALCOHOL:  none


Drugs:  None





Current Problem List


Problem List


Problems


Medical Problems:


(1) Bloody diarrhea


Status: Acute  








Problems:  





Current Medications


Current Medications





Current Medications


Ondansetron HCl (Zofran) 4 mg PRN Q8HRS  PRN IV NAUSEA/VOMITING;  Start 10/17/

17 at 18:45;  Stop 10/18/17 at 18:44


Phytonadione (Vitamin K Ampule) 5 mg 1X  ONCE SQ ;  Start 10/17/17 at 19:15;  

Stop 10/17/17 at 19:16;  Status UNV


Acetaminophen/ Codeine Phosphate (Tylenol #3) 1 tab PRN Q8HRS  PRN PO PAIN;  

Start 10/17/17 at 19:15;  Status UNV


Amlodipine Besylate (Norvasc) 10 mg DAILY PO ;  Start 10/18/17 at 09:00;  

Status UNV


Atenolol (Tenormin) 50 mg DAILY PO ;  Start 10/18/17 at 09:00;  Status UNV


Atorvastatin Calcium (Lipitor) 40 mg QHS PO ;  Start 10/17/17 at 21:00;  Status 

UNV


Calcium Carbonate/ Glycine (Tums) 200 mg BID PO ;  Start 10/17/17 at 21:00;  

Status UNV


Vitamin D (Vitamin D3) 2,000 unit DAILY PO ;  Start 10/18/17 at 09:00;  Status 

UNV


Clonidine HCl (Catapres Tts-3) 1 patch WEEKLY TD ;  Start 10/24/17 at 09:00;  

Status UNV


Darbepoetin Mo (Aranesp) 100 mcg WEEKLYHS SQ ;  Start 10/17/17 at 21:00;  

Status UNV


Diclofenac Sodium (Voltaren) 4 ophelia QID TP ;  Start 10/17/17 at 21:00;  Status 

UNV


Doxazosin Mesylate (Cardura) 4 mg DAILY PO ;  Start 10/18/17 at 09:00;  Status 

UNV


Ferrous Sulfate (Feosol) 325 mg DAILY PO ;  Start 10/18/17 at 09:00;  Status UNV


Acetaminophen/ Hydrocodone Bitart (Lortab 5/325) 1 tab Q6HRS  PRN PO PAIN;  

Start 10/17/17 at 19:15;  Status UNV


Montelukast Sodium (Singulair) 10 mg DAILY PO ;  Start 10/18/17 at 09:00;  

Status UNV


Polyethylene Glycol (miraLAX PACKET) 17 gm DAILY PO ;  Start 10/18/17 at 09:00;

  Status UNV


Non-Formulary Medication 2 puff PRN Q4-6HRS IH ;  Start 10/17/17 at 19:15;  

Status UNV


Non-Formulary Medication 1 tab TID PO ;  Start 10/17/17 at 21:00;  Status UNV


Non-Formulary Medication 40 mg DAILYAC PO ;  Start 10/18/17 at 07:30;  Status 

UNV


Non-Formulary Medication 180 mg DAILY PO ;  Start 10/18/17 at 09:00;  Status UNV


Non-Formulary Medication 1 tab TID PO ;  Start 10/17/17 at 21:00;  Status UNV


Non-Formulary Medication 5 mg DAILY PO ;  Start 10/18/17 at 09:00;  Status UNV


Non-Formulary Medication 2 puff BID IH ;  Start 10/17/17 at 21:00;  Status UNV


Non-Formulary Medication 1 ophelia TID TP ;  Start 10/17/17 at 21:00;  Status UNV


Non-Formulary Medication 2 mg TID  PRN PO MUSCLE SPASMS;  Start 10/17/17 at 19:

15;  Status UNV


Non-Formulary Medication 4 mg BID PO ;  Start 10/17/17 at 21:00;  Status UNV


Hydralazine HCl (Apresoline Inj) 10 mg PRN Q4HRS  PRN IVP ELEVATED BP, SEE 

COMMENTS;  Start 10/17/17 at 19:15;  Status UNV


Diphenhydramine HCl (Benadryl) 25 mg PRN QHS  PRN PO INSOMNIA;  Start 10/17/17 

at 19:15;  Status UNV


Iohexol (Omnipaque 240 Mg/ml) 30 ml 1X  ONCE PO ;  Start 10/17/17 at 19:30;  

Stop 10/17/17 at 19:31;  Status UNV





Active Scripts


Active


Macrobid 100 Mg Capsule (Nitrofurantoin Monohyd/M-Cryst) 100 Mg Capsule 1 Cap 

PO BID


Hamilton 5-325 Tablet (Acetaminophen/Hydrocodone Bitart) 1 Each Tablet 1 Tab PO 

Q6HRS PRN


Nystatin 15 Gm Oint...g. 1 Ophelia TP TID


Aranesp Syringe (Darbepoetin Mo In Polysorbat) 100 Mcg/0.5 Ml Disp.syrin 100 

Mcg SQ WEEKLYHS


Metolazone 5 Mg Tablet 5 Mg PO DAILY


Tenormin (Atenolol) 50 Mg Tablet 50 Mg PO DAILY


Norvasc (Amlodipine Besylate) 10 Mg Tablet 10 Mg PO DAILY


Reported


Montelukast Sodium Tablet (Montelukast Sodium) 10 Mg Tablet 1 Tab PO DAILY


Tizanidine Hcl 2 Mg Tablet 2 Mg PO TID PRN


Allegra Allergy (Fexofenadine Hcl) 180 Mg Tablet 180 Mg PO DAILY


Ferrous Sulfate 325 Mg Tablet 325 Mg PO DAILY


Catapres-Tts 3 (Clonidine) 1 Each Patch.tdwk 1 Each TD WEEKLY


Vitamin D3 (Cholecalciferol (Vitamin D3)) 1,000 Unit Tablet 2,000 Unit PO DAILY


Detrol (Tolterodine Tartrate) 2 Mg Tablet 4 Mg PO BID


Proair Hfa Inhaler (Albuterol Sulfate) 8.5 Gm Hfa.aer.ad 2 Puff IH PRN Q4-6HRS


Dulera 200 Mcg/5 Mcg Inhaler (Mometasone/Formoterol) 13 Gm Hfa.aer.ad 2 Puff IH 

BID


Calcium Carbonate 200 Mg Tab.chew 200 Mg PO BID


Esomeprazole Capsule (Esomeprazole Strontium) 40 Mg Capsule.dr 40 Mg PO DAILYAC


Doxazosin Mesylate 4 Mg Tablet 1 Tab PO DAILY


Miralax (Polyethylene Glycol 3350) 17 Gm Powd.pack 1 Packet PO DAILY


Voltaren (Diclofenac Sodium) 100 Gm Gel..gram. 4 Gm TP QID


Lipitor (Atorvastatin Calcium) 40 Mg Tablet 1 Tab PO QHS


Tylenol With Codeine #3 Tablet (Acetaminophen/Codeine Phosphate) 1 Each Tablet 

1 Each PO PRN Q8HRS PRN


Cyclobenzaprine Hcl 5 Mg Tablet 1 Tab PO TID


Hydralazine Hcl 100 Mg Tablet 1 Tab PO TID





Allergies


Allergies:  


Coded Allergies:  


     NSAIDS (Non-Steroidal Anti-Inflamma (Verified  Allergy, Intermediate, 3/16/

17)


     carvedilol (Verified  Allergy, Intermediate, 2/16/15)





ROS


General:  No: Chills, Night Sweats, Fatigue, Malaise, Appetite, Other


PSYCHOLOGICAL ROS:  No: Anxiety, Behavioral Disorder, Concentration difficultie

, Decreased libido, Depression, Disorientation, Hallucinations, Hostility, 

Irritablity, Memory difficulties, Mood Swings, Obsessive thoughts, Physical 

abuse, Sexual abuse, Sleep disturbances, Suicidal ideation, Other


Eyes:  No Blurry vision, No Decreased vision, No Double vision, No Dry eyes, No 

Excessive tearing, No Eye Pain, No Itchy Eyes, No Loss of vision, No Photophobia

, No Scotomata, No Uses contacts, No Uses glasses, No Other


HEENT:  No: Heacaches, Visual Changes, Hearing change, Nasal congestion, Nasal 

discharge, Oral lesions, Sinus pain, Sore Throat, Epistaxis, Sneezing, Snoring, 

Tinnitus, Vertigo, Vocal changes, Other


ALLERGY AND IMMUNOLOGY:  No: Hives, Insect Bite Sensitivity, Itchy/Watery Eyes, 

Nasal Congestion, Post Nasal Drip, Seasonal Allergies, Other


Hematological and Lymphatic:  No: Bleeding Problems, Blood Clots, Blood 

Transfusions, Brusing, Night Sweats, Pallor, Swollen Lymph Nodes, Other


ENDOCRINE:  No: Breast Changes, Galactorrhea, Hair Pattern Changes, Hot Flashes

, Malaise/lethargy, Mood Swings, Palpitations, Polydipsia/polyuria, Skin Changes

, Temperature Intolerance, Unexpected Weight Changes, Other


Breast:  No New/Changing Breast Lumps, No Nipple changes, No Nipple discharge, 

No Other


Cardiovascular:  yes Chest Pain


Gastrointestinal:  Yes Abdominal Pain, Yes Hematochezia


Genitourinary:  No Dysuria, No Frequency, No Incontinence, No Hematuria, No 

Retention, No Discharge, No Urgency, No Pain, No Flank Pain, No Other, No , No 

, No , No , No , No , No 


Neurological:  No Behavorial Changes, No Bowel/Bladder ControlChng, No Confusion

, No Dizziness, No Gait Disturbance, No Headaches, No Impaired Coord/balance, 

No Memory Loss, No Numbness/Tingling, No Seizures, No Speech Problems, No 

Tremors, No Visual Changes, No Weakness, No Other





Physical Exam


General:  Alert, Oriented X3, Cooperative, No acute distress


HEENT:  Atraumatic, PERRLA, EOMI


Lungs:  Clear to auscultation, Normal air movement


Heart:  S1S2, RRR, no thrills, no rubs, no gallops, no murmurs


Cardiovascular:  S1, S2


Breasts:  Normal, Rt breast nml w/o mass, Lt breast nml w/o mass, Nipples normal


Abdomen:  Normal bowel sounds, Soft, No tenderness, No hepatosplenomegaly, No 

masses


PELVIC:  Nml ext genitalia


Extremities:  No clubbing, No cyanosis, No edema, Normal pulses, No tenderness/

swelling


Skin:  No rashes, No breakdown, No significant lesion


Neuro:  Normal gait, Normal speech, Strength at 5/5 X4 ext, Normal tone, 

Sensation intact, Cranial nerves 3-12 NL, Reflexes 2+


Psych/Mental Status:  Mental status NL, Mood NL





Vitals


Vitals





Vital Signs








  Date Time  Temp Pulse Resp B/P (MAP) Pulse Ox O2 Delivery O2 Flow Rate FiO2


 


10/17/17 18:30  72 20 135/63 (87) 95 Room Air  


 


10/17/17 16:15 97.5       





 97.5       











Labs


Labs





Laboratory Tests








Test


  10/17/17


16:30 10/17/17


16:50 10/17/17


17:30


 


Troponin I Quantitative


  < 0.017 ng/mL


(0.000-0.055) 


  


 


 


Stool Occult Blood  Positive (NEG)  


 


White Blood Count


  


  


  19.0 x10^3/uL


(4.0-11.0)


 


Red Blood Count


  


  


  4.45 x10^6/uL


(3.50-5.40)


 


Hemoglobin


  


  


  12.3 g/dL


(12.0-15.5)


 


Hematocrit


  


  


  39.1 %


(36.0-47.0)


 


Mean Corpuscular Volume   88 fL () 


 


Mean Corpuscular Hemoglobin   28 pg (25-35) 


 


Mean Corpuscular Hemoglobin


Concent 


  


  32 g/dL


(31-37)


 


Red Cell Distribution Width


  


  


  26.4 %


(11.5-14.5)


 


Platelet Count


  


  


  259 x10^3/uL


(140-400)


 


Neutrophils (%) (Auto)   83 % (31-73) 


 


Lymphocytes (%) (Auto)   7 % (24-48) 


 


Monocytes (%) (Auto)   8 % (0-9) 


 


Eosinophils (%) (Auto)   2 % (0-3) 


 


Basophils (%) (Auto)   1 % (0-3) 


 


Neutrophils # (Auto)


  


  


  15.8 x10^3uL


(1.8-7.7)


 


Lymphocytes # (Auto)


  


  


  1.3 x10^3/uL


(1.0-4.8)


 


Monocytes # (Auto)


  


  


  1.5 x10^3/uL


(0.0-1.1)


 


Eosinophils # (Auto)


  


  


  0.4 x10^3/uL


(0.0-0.7)


 


Basophils # (Auto)


  


  


  0.1 x10^3/uL


(0.0-0.2)


 


Segmented Neutrophils %   72 % (35-66) 


 


Band Neutrophils %   6 % (0-9) 


 


Lymphocytes %   10 % (24-48) 


 


Monocytes %   7 % (0-10) 


 


Eosinophils %   4 % (0-5) 


 


Basophils %   1 % (0-3) 


 


Toxic Granulation   Mod 


 


Toxic Vacuolation   Slight 


 


Platelet Estimate


  


  


  Adequate


(ADEQUATE)


 


Polychromasia   Slight 


 


Anisocytosis   Mod 


 


Prothrombin Time


  


  


  44.2 SEC


(11.7-14.0)


 


Prothromb Time International


Ratio 


  


  5.1 (0.8-1.1) 


 


 


Activated Partial


Thromboplast Time 


  


  69 SEC (24-38) 


 


 


Sodium Level


  


  


  140 mmol/L


(136-145)


 


Potassium Level


  


  


  3.4 mmol/L


(3.5-5.1)


 


Chloride Level


  


  


  98 mmol/L


()


 


Carbon Dioxide Level


  


  


  30 mmol/L


(21-32)


 


Anion Gap   12 (6-14) 


 


Blood Urea Nitrogen


  


  


  35 mg/dL


(7-20)


 


Creatinine


  


  


  6.4 mg/dL


(0.6-1.0)


 


Estimated GFR


(Cockcroft-Gault) 


  


  7.6 


 


 


Glucose Level


  


  


  112 mg/dL


(70-99)


 


Calcium Level


  


  


  9.8 mg/dL


(8.5-10.1)


 


Total Bilirubin


  


  


  0.3 mg/dL


(0.2-1.0)


 


Direct Bilirubin


  


  


  0.1 mg/dL


(0.0-0.2)


 


Aspartate Amino Transf


(AST/SGOT) 


  


  29 U/L (15-37) 


 


 


Alanine Aminotransferase


(ALT/SGPT) 


  


  < 6 U/L


(14-59)


 


Alkaline Phosphatase


  


  


  199 U/L


()


 


Creatine Kinase


  


  


  92 U/L


()


 


Creatine Kinase MB (Mass)


  


  


  0.7 ng/mL


(0.0-3.6)


 


Creatine Kinase MB Relative


Index 


  


  0.8 % (0-4) 


 


 


Total Protein


  


  


  7.8 g/dL


(6.4-8.2)


 


Albumin


  


  


  3.3 g/dL


(3.4-5.0)








Laboratory Tests








Test


  10/17/17


16:30 10/17/17


16:50 10/17/17


17:30


 


Troponin I Quantitative


  < 0.017 ng/mL


(0.000-0.055) 


  


 


 


Stool Occult Blood  Positive (NEG)  


 


White Blood Count


  


  


  19.0 x10^3/uL


(4.0-11.0)


 


Red Blood Count


  


  


  4.45 x10^6/uL


(3.50-5.40)


 


Hemoglobin


  


  


  12.3 g/dL


(12.0-15.5)


 


Hematocrit


  


  


  39.1 %


(36.0-47.0)


 


Mean Corpuscular Volume   88 fL () 


 


Mean Corpuscular Hemoglobin   28 pg (25-35) 


 


Mean Corpuscular Hemoglobin


Concent 


  


  32 g/dL


(31-37)


 


Red Cell Distribution Width


  


  


  26.4 %


(11.5-14.5)


 


Platelet Count


  


  


  259 x10^3/uL


(140-400)


 


Neutrophils (%) (Auto)   83 % (31-73) 


 


Lymphocytes (%) (Auto)   7 % (24-48) 


 


Monocytes (%) (Auto)   8 % (0-9) 


 


Eosinophils (%) (Auto)   2 % (0-3) 


 


Basophils (%) (Auto)   1 % (0-3) 


 


Neutrophils # (Auto)


  


  


  15.8 x10^3uL


(1.8-7.7)


 


Lymphocytes # (Auto)


  


  


  1.3 x10^3/uL


(1.0-4.8)


 


Monocytes # (Auto)


  


  


  1.5 x10^3/uL


(0.0-1.1)


 


Eosinophils # (Auto)


  


  


  0.4 x10^3/uL


(0.0-0.7)


 


Basophils # (Auto)


  


  


  0.1 x10^3/uL


(0.0-0.2)


 


Segmented Neutrophils %   72 % (35-66) 


 


Band Neutrophils %   6 % (0-9) 


 


Lymphocytes %   10 % (24-48) 


 


Monocytes %   7 % (0-10) 


 


Eosinophils %   4 % (0-5) 


 


Basophils %   1 % (0-3) 


 


Toxic Granulation   Mod 


 


Toxic Vacuolation   Slight 


 


Platelet Estimate


  


  


  Adequate


(ADEQUATE)


 


Polychromasia   Slight 


 


Anisocytosis   Mod 


 


Prothrombin Time


  


  


  44.2 SEC


(11.7-14.0)


 


Prothromb Time International


Ratio 


  


  5.1 (0.8-1.1) 


 


 


Activated Partial


Thromboplast Time 


  


  69 SEC (24-38) 


 


 


Sodium Level


  


  


  140 mmol/L


(136-145)


 


Potassium Level


  


  


  3.4 mmol/L


(3.5-5.1)


 


Chloride Level


  


  


  98 mmol/L


()


 


Carbon Dioxide Level


  


  


  30 mmol/L


(21-32)


 


Anion Gap   12 (6-14) 


 


Blood Urea Nitrogen


  


  


  35 mg/dL


(7-20)


 


Creatinine


  


  


  6.4 mg/dL


(0.6-1.0)


 


Estimated GFR


(Cockcroft-Gault) 


  


  7.6 


 


 


Glucose Level


  


  


  112 mg/dL


(70-99)


 


Calcium Level


  


  


  9.8 mg/dL


(8.5-10.1)


 


Total Bilirubin


  


  


  0.3 mg/dL


(0.2-1.0)


 


Direct Bilirubin


  


  


  0.1 mg/dL


(0.0-0.2)


 


Aspartate Amino Transf


(AST/SGOT) 


  


  29 U/L (15-37) 


 


 


Alanine Aminotransferase


(ALT/SGPT) 


  


  < 6 U/L


(14-59)


 


Alkaline Phosphatase


  


  


  199 U/L


()


 


Creatine Kinase


  


  


  92 U/L


()


 


Creatine Kinase MB (Mass)


  


  


  0.7 ng/mL


(0.0-3.6)


 


Creatine Kinase MB Relative


Index 


  


  0.8 % (0-4) 


 


 


Total Protein


  


  


  7.8 g/dL


(6.4-8.2)


 


Albumin


  


  


  3.3 g/dL


(3.4-5.0)











VTE Prophylaxis Ordered


VTE Prophylaxis Devices:  Yes


VTE Pharmacological Prophylaxi:  Yes





Assessment/Plan


Assessment/Plan


1. Hematochezia in the back ground of high INR (5)


2. COumadin toxicity 


3. Hx of clots in indwelling RT subclavian HD cath


4, ESRD on HD TTHS


5. Lefft AV fistula site, immature yet


6. Anemia of CKD


7. HTN, asthma, DM, dyslipidemia - all chronic, stable


8. Obesity BMI 33.3


9. 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





PLAN:


Admit 2 mN


Give Vit K


INR check alex


HH and BMP and iNR tmr


HD per renal


Consult GI and Renal


PT./OT


SSI


Resume home meds except warf


CT abd re the abd pain - dw ER MD (oral contrast only)


Agree with c diff stool check


WBC 19 - but not unusual to have loose stools with bloody BM  as blood is a 

laxative





Seen at ER and dw family my plan - agrees











SASKIA PIERRE MD Oct 17, 2017 19:27

## 2017-10-17 NOTE — RAD
CT Abdomen and Pelvis without Intravenous Contrast:

 

History: Bloody diarrhea since 1400 hours.

 

Comparison: None.

 

Technique: After administration of oral contrast only, CT of the abdomen 

and pelvis was performed. 

 

Exposure: One or more of the following individualized dose reduction 

techniques were utilized for this examination:  1. Automated exposure 

control  2. Adjustment of the mA and/or kV according to patient size  3. 

Use of iterative reconstruction technique

 

Findings:

 

Evaluation of solid organs is limited by lack of intravenous contrast.

 

Liver, spleen, pancreas, gallbladder, and bilateral adrenal glands 

unremarkable.  No urinary stone is identified.  Right kidney demonstrates 

a few exophytic small lesions, not adequately characterized.  No bowel 

obstruction or inflammation is identified.  Appendix is without evidence 

of inflammation.  Colonic diverticulosis is noted, but no convincing 

diverticulitis is seen.  No free air or free fluid is seen in the abdomen 

or pelvis.  Urinary bladder is unremarkable.  The uterus is absent.  There

is a fat-containing umbilical hernia which demonstrates fat 

stranding/inflammatory change.

 

Degenerative changes are present in the spine.  Superior endplate of L1 

demonstrates superior endplate compression fracture, age-indeterminate.

 

Impression:

1.  Limited by lack of intravenous contrast.

2.  No convincing bowel obstruction or inflammation is identified.

3.  Colonic diverticulosis.

4.  Fat-containing umbilical hernia demonstrating inflammatory change.

5.  Superior endplate L1 compression fracture, age-indeterminate.

 

Electronically signed by: Anjum Granados MD (10/17/2017 8:31 PM) [facility]

## 2017-10-17 NOTE — EKG
[facility]

              [address] [place] [address]

Test Date:    2017-10-17               Test Time:    17:05:29

Pat Name:     EFREN BLACKWOOD             Department:   

Patient ID:   [id number]           Room:          

Gender:       F                        Technician:   

:          [id number]               Requested By: BRUNA MCCARTHY

Order Number: [id number]            Reading MD:     

                                 Measurements

Intervals                              Axis          

Rate:         74                       P:            15

WV:           166                      QRS:          -9

QRSD:         100                      T:            111

QT:           416                                    

QTc:          462                                    

                           Interpretive Statements

SINUS RHYTHM

LEFTWARD AXIS

LVH WITH REPOLARIZATION ABNORMALITY

RI6.01          Unconfirmed report

Compared to ECG 2017 21:52:41

Left ventricular hypertrophy now present

Early repolarization now present

T-wave abnormality no longer present

## 2017-10-18 VITALS — DIASTOLIC BLOOD PRESSURE: 42 MMHG | SYSTOLIC BLOOD PRESSURE: 105 MMHG

## 2017-10-18 VITALS — SYSTOLIC BLOOD PRESSURE: 103 MMHG | DIASTOLIC BLOOD PRESSURE: 46 MMHG

## 2017-10-18 VITALS — SYSTOLIC BLOOD PRESSURE: 96 MMHG | DIASTOLIC BLOOD PRESSURE: 41 MMHG

## 2017-10-18 VITALS — DIASTOLIC BLOOD PRESSURE: 58 MMHG | SYSTOLIC BLOOD PRESSURE: 158 MMHG

## 2017-10-18 VITALS — DIASTOLIC BLOOD PRESSURE: 59 MMHG | SYSTOLIC BLOOD PRESSURE: 113 MMHG

## 2017-10-18 VITALS — DIASTOLIC BLOOD PRESSURE: 41 MMHG | SYSTOLIC BLOOD PRESSURE: 103 MMHG

## 2017-10-18 VITALS — SYSTOLIC BLOOD PRESSURE: 112 MMHG | DIASTOLIC BLOOD PRESSURE: 46 MMHG

## 2017-10-18 LAB
ANION GAP SERPL CALC-SCNC: 12 MMOL/L (ref 6–14)
BASOPHILS # BLD AUTO: 0.1 X10^3/UL (ref 0–0.2)
BASOPHILS NFR BLD: 1 % (ref 0–3)
BUN SERPL-MCNC: 38 MG/DL (ref 7–20)
CALCIUM SERPL-MCNC: 9.2 MG/DL (ref 8.5–10.1)
CHLORIDE SERPL-SCNC: 98 MMOL/L (ref 98–107)
CO2 SERPL-SCNC: 26 MMOL/L (ref 21–32)
CREAT SERPL-MCNC: 7 MG/DL (ref 0.6–1)
EOSINOPHIL NFR BLD: 4 % (ref 0–3)
ERYTHROCYTE [DISTWIDTH] IN BLOOD BY AUTOMATED COUNT: 26.7 % (ref 11.5–14.5)
GFR SERPLBLD BASED ON 1.73 SQ M-ARVRAT: 6.9 ML/MIN
GLUCOSE SERPL-MCNC: 117 MG/DL (ref 70–99)
HCT VFR BLD CALC: 33.9 % (ref 36–47)
HGB BLD-MCNC: 10.7 G/DL (ref 12–15.5)
INR PPP: 4.5 (ref 0.8–1.1)
LYMPHOCYTES # BLD: 2 X10^3/UL (ref 1–4.8)
LYMPHOCYTES NFR BLD AUTO: 13 % (ref 24–48)
MCH RBC QN AUTO: 28 PG (ref 25–35)
MCHC RBC AUTO-ENTMCNC: 32 G/DL (ref 31–37)
MCV RBC AUTO: 88 FL (ref 79–100)
MONOCYTES NFR BLD: 7 % (ref 0–9)
NEUTROPHILS NFR BLD AUTO: 76 % (ref 31–73)
PLATELET # BLD AUTO: 271 X10^3/UL (ref 140–400)
POTASSIUM SERPL-SCNC: 4.2 MMOL/L (ref 3.5–5.1)
PROTHROMBIN TIME: 39.7 SEC (ref 11.7–14)
RBC # BLD AUTO: 3.84 X10^6/UL (ref 3.5–5.4)
SODIUM SERPL-SCNC: 136 MMOL/L (ref 136–145)
WBC # BLD AUTO: 15.5 X10^3/UL (ref 4–11)

## 2017-10-18 RX ADMIN — OXYBUTYNIN CHLORIDE SCH MG: 5 TABLET ORAL at 13:30

## 2017-10-18 RX ADMIN — VITAMIN D, TAB 1000IU (100/BT) SCH UNIT: 25 TAB at 09:35

## 2017-10-18 RX ADMIN — CETIRIZINE HYDROCHLORIDE SCH MG: 10 TABLET, FILM COATED ORAL at 09:35

## 2017-10-18 RX ADMIN — OXYBUTYNIN CHLORIDE SCH MG: 5 TABLET ORAL at 21:00

## 2017-10-18 RX ADMIN — NYSTATIN SCH APP: 100000 CREAM TOPICAL at 13:30

## 2017-10-18 RX ADMIN — ALBUTEROL SULFATE SCH MG: 108 AEROSOL, METERED RESPIRATORY (INHALATION) at 20:44

## 2017-10-18 RX ADMIN — NYSTATIN SCH APP: 100000 CREAM TOPICAL at 09:00

## 2017-10-18 RX ADMIN — ALBUTEROL SULFATE SCH MG: 108 AEROSOL, METERED RESPIRATORY (INHALATION) at 15:22

## 2017-10-18 RX ADMIN — BUDESONIDE SCH MG: 0.5 INHALANT RESPIRATORY (INHALATION) at 07:18

## 2017-10-18 RX ADMIN — CALCIUM CARBONATE (ANTACID) CHEW TAB 500 MG SCH MG: 500 CHEW TAB at 09:00

## 2017-10-18 RX ADMIN — ALBUTEROL SULFATE SCH MG: 108 AEROSOL, METERED RESPIRATORY (INHALATION) at 07:18

## 2017-10-18 RX ADMIN — DICLOFENAC SODIUM SCH APP: 10 GEL TOPICAL at 08:01

## 2017-10-18 RX ADMIN — DICLOFENAC SODIUM SCH APP: 10 GEL TOPICAL at 13:50

## 2017-10-18 RX ADMIN — CYCLOBENZAPRINE HYDROCHLORIDE SCH MG: 10 TABLET, FILM COATED ORAL at 09:00

## 2017-10-18 RX ADMIN — CALCIUM CARBONATE (ANTACID) CHEW TAB 500 MG SCH MG: 500 CHEW TAB at 22:07

## 2017-10-18 RX ADMIN — OXYBUTYNIN CHLORIDE SCH MG: 5 TABLET ORAL at 09:00

## 2017-10-18 RX ADMIN — PANTOPRAZOLE SODIUM SCH MG: 40 TABLET, DELAYED RELEASE ORAL at 08:00

## 2017-10-18 RX ADMIN — BUDESONIDE SCH MG: 0.5 INHALANT RESPIRATORY (INHALATION) at 20:44

## 2017-10-18 RX ADMIN — ATORVASTATIN CALCIUM SCH MG: 40 TABLET, FILM COATED ORAL at 22:07

## 2017-10-18 RX ADMIN — POLYETHYLENE GLYCOL 3350 SCH GM: 17 POWDER, FOR SOLUTION ORAL at 09:00

## 2017-10-18 RX ADMIN — NYSTATIN SCH APP: 100000 CREAM TOPICAL at 21:00

## 2017-10-18 RX ADMIN — DICLOFENAC SODIUM SCH APP: 10 GEL TOPICAL at 22:06

## 2017-10-18 RX ADMIN — Medication SCH MG: at 09:00

## 2017-10-18 RX ADMIN — ALBUTEROL SULFATE SCH MG: 108 AEROSOL, METERED RESPIRATORY (INHALATION) at 11:27

## 2017-10-18 RX ADMIN — MONTELUKAST SODIUM SCH MG: 10 TABLET, FILM COATED ORAL at 09:00

## 2017-10-18 RX ADMIN — DICLOFENAC SODIUM SCH APP: 10 GEL TOPICAL at 17:28

## 2017-10-18 NOTE — CONS
DATE OF CONSULTATION:  



PRIMARY PHYSICIAN:  Dr. Saira Tse.



REASON FOR CONSULTATION:  End-stage renal disease, dialysis.



HISTORY OF PRESENT ILLNESS:  The patient is a 77-year-old 

female who started dialysis a few months ago.  She is under the care of Dr. Chong.  Currently, she dialyzes on a Tuesday, Thursday, Saturday schedule at

[facility].  She has an AV fistula that has not matured yet and gets

dialysis through a right subclavian catheter.  She was at home yesterday and

thinks she may have eaten a 2-3 day old gumball that was sitting outside.  She

developed diarrhea, some of which had some blood in it.  She was hence brought

to the ER for further evaluation.  She is noted to have had a potassium of 3.4

on arrival.  This was, however, right after her dialysis.  This has now

recovered to 4.2.  She was noted to have a hemoglobin of 12.3 at presentation,

which is now down to 10.7.  She was noted to be coagulopathic.  She did undergo

CT scan.  Her white count was also elevated and hence was admitted to the

hospital where we have been asked to see her for her end-stage renal disease

needs.



For rest of details, see electronic records.

 



______________________________

KATIE HOLLINGSWORTH MD



DR:  BRIAN/slade  JOB#:  [id number] / [id number]

DD:  10/18/2017 10:38  DT:  10/18/2017 12:25

## 2017-10-18 NOTE — PDOC2
CONSULT


Date of Consult


Date of Consult


DATE: 10/18/17 


TIME: 10:30





Reason for Consult


Reason for Consult:


ESRD





Referring Physician


Referring Physician:


Dr Tse





Identification/Chief Complaint


Chief Complaint


Diarrhea


Problems:  





Source


Source:  Chart review, Patient





History of Present Illness


Reason for Visit:


as dictated





Past Medical History


Cardiovascular:  HTN, Hyperlipidemia


Pulmonary:  Asthma, Bronchitis


GI:  GERD


Heme/Onc:  Anemia NOS


Hepatobiliary:  No pertinent hx


Psych:  No pertinent hx


Musculoskeletal:   low back pain


Rheumatologic:  No pertinent hx


Infectious disease:  No pertinent hx


ENT:  No pertinent hx


Endocrine:  Diabetes





Past Surgical History


Past Surgical History:  Other (HD cath RT subclavian, AV fistula left arm)





Family History


Family History:  Hypertension





Social History


No


ALCOHOL:  none


Drugs:  None


Lives:  Alone





Current Problem List


Problem List


Problems


Medical Problems:


(1) Bloody diarrhea


Status: Acute  











Current Medications


Current Medications





Current Medications


Ondansetron HCl (Zofran) 4 mg PRN Q8HRS  PRN IV NAUSEA/VOMITING;  Start 10/17/

17 at 18:45;  Stop 10/18/17 at 18:44


Phytonadione (Vitamin K Ampule) 5 mg 1X  ONCE SQ  Last administered on 10/17/

17at 19:50;  Start 10/17/17 at 19:45;  Stop 10/17/17 at 19:46;  Status DC


Acetaminophen/ Codeine Phosphate (Tylenol #3) 1 tab PRN Q8HRS  PRN PO PAIN;  

Start 10/17/17 at 19:15


Amlodipine Besylate (Norvasc) 10 mg DAILY PO ;  Start 10/18/17 at 09:00;  Stop 

10/18/17 at 09:41;  Status DC


Atenolol (Tenormin) 50 mg DAILY PO ;  Start 10/18/17 at 09:00;  Stop 10/18/17 

at 09:41;  Status DC


Atorvastatin Calcium (Lipitor) 40 mg QHS PO  Last administered on 10/17/17at 23:

10;  Start 10/17/17 at 21:00


Calcium Carbonate/ Glycine (Tums) 500 mg BID PO  Last administered on 10/17/

17at 23:09;  Start 10/17/17 at 21:00


Vitamin D (Vitamin D3) 2,000 unit DAILY PO  Last administered on 10/18/17at 09:

35;  Start 10/18/17 at 09:00


Clonidine HCl (Catapres Tts-3) 1 patch WEEKLY TD ;  Start 10/18/17 at 09:00;  

Stop 10/18/17 at 09:41;  Status DC


Darbepoetin Mo (Aranesp) 100 mcg WEEKLYHS SQ ;  Start 10/23/17 at 21:00


Diclofenac Sodium (Voltaren) 4 scarlett QID TP  Last administered on 10/18/17at 08:01

;  Start 10/17/17 at 21:00


Doxazosin Mesylate (Cardura) 4 mg DAILY PO ;  Start 10/18/17 at 09:00;  Stop 10/

18/17 at 09:41;  Status DC


Ferrous Sulfate (Feosol) 325 mg DAILY PO ;  Start 10/18/17 at 09:00


Acetaminophen/ Hydrocodone Bitart (Lortab 5/325) 1 tab PRN Q6HRS  PRN PO PAIN;  

Start 10/17/17 at 19:15


Montelukast Sodium (Singulair) 10 mg DAILY PO ;  Start 10/18/17 at 09:00


Polyethylene Glycol (miraLAX PACKET) 17 gm DAILY PO ;  Start 10/18/17 at 09:00


Non-Formulary Medication 2 puff PRN Q4-6HRS IH ;  Start 10/17/17 at 19:15;  

Stop 10/17/17 at 19:43;  Status DC


Cyclobenzaprine HCl (Flexeril) 5 mg TID PO  Last administered on 10/17/17at 23:

10;  Start 10/17/17 at 21:00;  Stop 10/18/17 at 09:41;  Status DC


Pantoprazole Sodium (Protonix) 40 mg DAILYAC PO  Last administered on 10/18/

17at 08:00;  Start 10/18/17 at 07:30


Cetirizine HCl (ZyrTEC) 10 mg DAILY PO  Last administered on 10/18/17at 09:35;  

Start 10/18/17 at 09:00


Hydralazine HCl (Apresoline) 100 mg TID PO  Last administered on 10/17/17at 23:

09;  Start 10/17/17 at 21:00


Metolazone (Zaroxolyn) 5 mg DAILY PO ;  Start 10/18/17 at 09:00


Non-Formulary Medication 2 puff BID IH ;  Start 10/17/17 at 21:00;  Stop 10/17/

17 at 21:00;  Status DC


Nystatin (Mycostatin) 1 scarlett TID TP ;  Start 10/17/17 at 21:00


Non-Formulary Medication 2 mg TID  PRN PO MUSCLE SPASMS;  Start 10/17/17 at 19:

15;  Stop 10/17/17 at 19:38;  Status DC


Oxybutynin Chloride (Ditropan) 5 mg [id number] PO  Last administered on 10/17/17at 

23:08;  Start 10/17/17 at 21:00


Hydralazine HCl (Apresoline Inj) 10 mg PRN Q4HRS  PRN IVP ELEVATED BP, SEE 

COMMENTS;  Start 10/17/17 at 19:15


Diphenhydramine HCl (Benadryl) 25 mg PRN QHS  PRN PO INSOMNIA;  Start 10/17/17 

at 19:15


Iohexol (Omnipaque 240 Mg/ml) 30 ml 1X  ONCE PO  Last administered on 10/17/

17at 19:30;  Start 10/17/17 at 19:30;  Stop 10/17/17 at 19:39;  Status DC


Albuterol Sulfate (Ventolin Neb Soln) 2.5 mg PRN Q4HRS  PRN NEB SHORTNESS OF 

BREATH;  Start 10/17/17 at 19:45


Albuterol Sulfate (Ventolin Neb Soln) 2.5 mg RTQID NEB  Last administered on 10/

18/17at 07:18;  Start 10/17/17 at 20:00


Budesonide (Pulmicort) 0.5 mg RTBID NEB  Last administered on 10/18/17at 07:18;

  Start 10/17/17 at 20:00


Phytonadione (Vitamin K Ampule) 10 mg 1X  ONCE SQ ;  Start 10/18/17 at 10:30;  

Stop 10/18/17 at 10:31





Active Scripts


Active


Aranesp Syringe (Darbepoetin Mo In Polysorbat) 100 Mcg/0.5 Ml Disp.syrin 100 

Mcg SQ WEEKLYHS


Metolazone 5 Mg Tablet 5 Mg PO DAILY


Tenormin (Atenolol) 50 Mg Tablet 50 Mg PO DAILY


Reported


Calcium Acetate 667 Mg Tablet 2,001 Mg PO TIDWMEALS


Tramadol Hcl 50 Mg Tablet 50 Mg PO PRN Q6-8HRS PRN


Warfarin Sodium 5 Mg Tablet 5 Mg PO DAILY


Allegra Allergy (Fexofenadine Hcl) 180 Mg Tablet 180 Mg PO DAILY


Vitamin D3 (Cholecalciferol (Vitamin D3)) 1,000 Unit Tablet 2,000 Unit PO DAILY


Proair Hfa Inhaler (Albuterol Sulfate) 8.5 Gm Hfa.aer.ad 2 Puff IH PRN Q4-6HRS


Dulera 200 Mcg/5 Mcg Inhaler (Mometasone/Formoterol) 13 Gm Hfa.aer.ad 2 Puff IH 

BID


Esomeprazole Capsule (Esomeprazole Strontium) 40 Mg Capsule.dr 40 Mg PO DAILYAC


Voltaren (Diclofenac Sodium) 100 Gm Gel..gram. 4 Gm TP QID


Lipitor (Atorvastatin Calcium) 40 Mg Tablet 1 Tab PO QHS


Hydralazine Hcl 100 Mg Tablet 1 Tab PO TID





Allergies


Allergies:  


Coded Allergies:  


     NSAIDS (Non-Steroidal Anti-Inflamma (Verified  Allergy, Intermediate, 3/16/

17)


     carvedilol (Verified  Allergy, Intermediate, 2/16/15)





ROS


Review of System


   GEN:      no Fevers      no Chills


   EYES:      no new  Visual Complaints


   ENT:      no EN Drainage      no Hearing deficiets


   CVS:      no Orthopnea      no CP


   RESP:      no SOB      no HIGH


   GI:      min Nausea      no Vomiting   + Diarrhea 


   :      no Dysuria      no Urgency


   HEME:      no easy bruising      no Palp Ly Nodes


   NEURO      no Focal Weakness   no Sz


   PSYCH:   no Suicidal Ideation   no Depression


   SKIN:      no Rashes


   ENDO:      no Polyuria or Polydipsia   no Hot/Cold Intolerance


   MU SK:      occ  Arthraigia      no Myalgia





Physical Exam


Physical Exam





General Appearance:       Awake      Alert Oriented x  3   In  no  Distress


Eyes:       VIsion Unchanged Conjunctiva Normal


EN:       No EN Drainage  Mucous Memb.    mois


Neck:         no  JVD   min  JVP  Supple   no  Thyromegaly


CVS:       S1 S2   soft   Murmur  No Gallop  No Rub   tr Edema


Resp:        no  Rales   no  Rhonchi   no  Acc. Muscle use


GI:       BAS +ve    NO Bruit   Non Tender   Non Distended


:        no  CVA tenderness;    no  Suprapubic Tenderness


SKIN:        no  Rashes  Breast Exam deferred


Mu.Sk:       Adequate ROM    no  Muscle Atrophy


Heme:       Unable to palpate Obvious LAD  no palp   Splenomegaly


NEURO:       Good Strength and Tone   Cranial Nerves II - XII grossly intact


Psych:        not   Depressed    no  Active hallucination


Vital Signs





Vital Signs








  Date Time  Temp Pulse Resp B/P (MAP) Pulse Ox O2 Delivery O2 Flow Rate FiO2


 


10/18/17 09:00  71  113/59    


 


10/18/17 08:00      Room Air  


 


10/18/17 07:00 97.9  20  96   





 97.9       








Assessment & Plan


ESRD:   Current FLuid and E-lyte status does not necessitate emergent need for 

Dialysis.  Will re-evaluate for Dialysis in am and continue on TTSat schedule.





Low K POA - now better (rebound post HD) 





HypoAlb - pt wants a pork chop for lunch - she tthinks she is eating better of 

late





Anemia: ? due to GI losses,  drops in H/H noted so restart Epogen    Transfuse  

with next HD as needed.





HTN:   Current BP meds reviewed.  See orders for changes.





Coagulopahty - can give FFp with am HD if needed





Bone & Mineral: follow Phos and later binder regimen as needed





Discussed Plan of Care and prognosis etc. at length with pt





Labs


Labs





Laboratory Tests








Test


  10/17/17


16:30 10/17/17


16:50 10/17/17


17:30 10/18/17


00:35


 


Troponin I Quantitative


  < 0.017 ng/mL


(0.000-0.055) 


  


  < 0.017 ng/mL


(0.000-0.055)


 


Stool Occult Blood  Positive (NEG)   


 


White Blood Count


  


  


  19.0 x10^3/uL


(4.0-11.0) 


 


 


Red Blood Count


  


  


  4.45 x10^6/uL


(3.50-5.40) 


 


 


Hemoglobin


  


  


  12.3 g/dL


(12.0-15.5) 


 


 


Hematocrit


  


  


  39.1 %


(36.0-47.0) 


 


 


Mean Corpuscular Volume   88 fL ()  


 


Mean Corpuscular Hemoglobin   28 pg (25-35)  


 


Mean Corpuscular Hemoglobin


Concent 


  


  32 g/dL


(31-37) 


 


 


Red Cell Distribution Width


  


  


  26.4 %


(11.5-14.5) 


 


 


Platelet Count


  


  


  259 x10^3/uL


(140-400) 


 


 


Neutrophils (%) (Auto)   83 % (31-73)  


 


Lymphocytes (%) (Auto)   7 % (24-48)  


 


Monocytes (%) (Auto)   8 % (0-9)  


 


Eosinophils (%) (Auto)   2 % (0-3)  


 


Basophils (%) (Auto)   1 % (0-3)  


 


Neutrophils # (Auto)


  


  


  15.8 x10^3uL


(1.8-7.7) 


 


 


Lymphocytes # (Auto)


  


  


  1.3 x10^3/uL


(1.0-4.8) 


 


 


Monocytes # (Auto)


  


  


  1.5 x10^3/uL


(0.0-1.1) 


 


 


Eosinophils # (Auto)


  


  


  0.4 x10^3/uL


(0.0-0.7) 


 


 


Basophils # (Auto)


  


  


  0.1 x10^3/uL


(0.0-0.2) 


 


 


Segmented Neutrophils %   72 % (35-66)  


 


Band Neutrophils %   6 % (0-9)  


 


Lymphocytes %   10 % (24-48)  


 


Monocytes %   7 % (0-10)  


 


Eosinophils %   4 % (0-5)  


 


Basophils %   1 % (0-3)  


 


Toxic Granulation   Mod  


 


Toxic Vacuolation   Slight  


 


Platelet Estimate


  


  


  Adequate


(ADEQUATE) 


 


 


Polychromasia   Slight  


 


Anisocytosis   Mod  


 


Prothrombin Time


  


  


  44.2 SEC


(11.7-14.0) 


 


 


Prothromb Time International


Ratio 


  


  5.1 (0.8-1.1) 


  


 


 


Activated Partial


Thromboplast Time 


  


  69 SEC (24-38) 


  


 


 


Sodium Level


  


  


  140 mmol/L


(136-145) 


 


 


Potassium Level


  


  


  3.4 mmol/L


(3.5-5.1) 


 


 


Chloride Level


  


  


  98 mmol/L


() 


 


 


Carbon Dioxide Level


  


  


  30 mmol/L


(21-32) 


 


 


Anion Gap   12 (6-14)  


 


Blood Urea Nitrogen


  


  


  35 mg/dL


(7-20) 


 


 


Creatinine


  


  


  6.4 mg/dL


(0.6-1.0) 


 


 


Estimated GFR


(Cockcroft-Gault) 


  


  7.6 


  


 


 


Glucose Level


  


  


  112 mg/dL


(70-99) 


 


 


Calcium Level


  


  


  9.8 mg/dL


(8.5-10.1) 


 


 


Total Bilirubin


  


  


  0.3 mg/dL


(0.2-1.0) 


 


 


Direct Bilirubin


  


  


  0.1 mg/dL


(0.0-0.2) 


 


 


Aspartate Amino Transf


(AST/SGOT) 


  


  29 U/L (15-37) 


  


 


 


Alanine Aminotransferase


(ALT/SGPT) 


  


  < 6 U/L


(14-59) 


 


 


Alkaline Phosphatase


  


  


  199 U/L


() 


 


 


Creatine Kinase


  


  


  92 U/L


() 


 


 


Creatine Kinase MB (Mass)


  


  


  0.7 ng/mL


(0.0-3.6) 


 


 


Creatine Kinase MB Relative


Index 


  


  0.8 % (0-4) 


  


 


 


Total Protein


  


  


  7.8 g/dL


(6.4-8.2) 


 


 


Albumin


  


  


  3.3 g/dL


(3.4-5.0) 


 


 


Test


  10/18/17


07:00 


  


  


 


 


White Blood Count


  15.5 x10^3/uL


(4.0-11.0) 


  


  


 


 


Red Blood Count


  3.84 x10^6/uL


(3.50-5.40) 


  


  


 


 


Hemoglobin


  10.7 g/dL


(12.0-15.5) 


  


  


 


 


Hematocrit


  33.9 %


(36.0-47.0) 


  


  


 


 


Mean Corpuscular Volume 88 fL ()    


 


Mean Corpuscular Hemoglobin 28 pg (25-35)    


 


Mean Corpuscular Hemoglobin


Concent 32 g/dL


(31-37) 


  


  


 


 


Red Cell Distribution Width


  26.7 %


(11.5-14.5) 


  


  


 


 


Platelet Count


  271 x10^3/uL


(140-400) 


  


  


 


 


Neutrophils (%) (Auto) 76 % (31-73)    


 


Lymphocytes (%) (Auto) 13 % (24-48)    


 


Monocytes (%) (Auto) 7 % (0-9)    


 


Eosinophils (%) (Auto) 4 % (0-3)    


 


Basophils (%) (Auto) 1 % (0-3)    


 


Neutrophils # (Auto)


  11.8 x10^3uL


(1.8-7.7) 


  


  


 


 


Lymphocytes # (Auto)


  2.0 x10^3/uL


(1.0-4.8) 


  


  


 


 


Monocytes # (Auto)


  1.0 x10^3/uL


(0.0-1.1) 


  


  


 


 


Eosinophils # (Auto)


  0.5 x10^3/uL


(0.0-0.7) 


  


  


 


 


Basophils # (Auto)


  0.1 x10^3/uL


(0.0-0.2) 


  


  


 


 


Prothrombin Time


  39.7 SEC


(11.7-14.0) 


  


  


 


 


Prothromb Time International


Ratio 4.5 (0.8-1.1) 


  


  


  


 


 


Sodium Level


  136 mmol/L


(136-145) 


  


  


 


 


Potassium Level


  4.2 mmol/L


(3.5-5.1) 


  


  


 


 


Chloride Level


  98 mmol/L


() 


  


  


 


 


Carbon Dioxide Level


  26 mmol/L


(21-32) 


  


  


 


 


Anion Gap 12 (6-14)    


 


Blood Urea Nitrogen


  38 mg/dL


(7-20) 


  


  


 


 


Creatinine


  7.0 mg/dL


(0.6-1.0) 


  


  


 


 


Estimated GFR


(Cockcroft-Gault) 6.9 


  


  


  


 


 


Glucose Level


  117 mg/dL


(70-99) 


  


  


 


 


Calcium Level


  9.2 mg/dL


(8.5-10.1) 


  


  


 


 


Troponin I Quantitative


  0.019 ng/mL


(0.000-0.055) 


  


  


 








Laboratory Tests








Test


  10/17/17


16:30 10/17/17


16:50 10/17/17


17:30 10/18/17


00:35


 


Troponin I Quantitative


  < 0.017 ng/mL


(0.000-0.055) 


  


  < 0.017 ng/mL


(0.000-0.055)


 


Stool Occult Blood  Positive (NEG)   


 


White Blood Count


  


  


  19.0 x10^3/uL


(4.0-11.0) 


 


 


Red Blood Count


  


  


  4.45 x10^6/uL


(3.50-5.40) 


 


 


Hemoglobin


  


  


  12.3 g/dL


(12.0-15.5) 


 


 


Hematocrit


  


  


  39.1 %


(36.0-47.0) 


 


 


Mean Corpuscular Volume   88 fL ()  


 


Mean Corpuscular Hemoglobin   28 pg (25-35)  


 


Mean Corpuscular Hemoglobin


Concent 


  


  32 g/dL


(31-37) 


 


 


Red Cell Distribution Width


  


  


  26.4 %


(11.5-14.5) 


 


 


Platelet Count


  


  


  259 x10^3/uL


(140-400) 


 


 


Neutrophils (%) (Auto)   83 % (31-73)  


 


Lymphocytes (%) (Auto)   7 % (24-48)  


 


Monocytes (%) (Auto)   8 % (0-9)  


 


Eosinophils (%) (Auto)   2 % (0-3)  


 


Basophils (%) (Auto)   1 % (0-3)  


 


Neutrophils # (Auto)


  


  


  15.8 x10^3uL


(1.8-7.7) 


 


 


Lymphocytes # (Auto)


  


  


  1.3 x10^3/uL


(1.0-4.8) 


 


 


Monocytes # (Auto)


  


  


  1.5 x10^3/uL


(0.0-1.1) 


 


 


Eosinophils # (Auto)


  


  


  0.4 x10^3/uL


(0.0-0.7) 


 


 


Basophils # (Auto)


  


  


  0.1 x10^3/uL


(0.0-0.2) 


 


 


Segmented Neutrophils %   72 % (35-66)  


 


Band Neutrophils %   6 % (0-9)  


 


Lymphocytes %   10 % (24-48)  


 


Monocytes %   7 % (0-10)  


 


Eosinophils %   4 % (0-5)  


 


Basophils %   1 % (0-3)  


 


Toxic Granulation   Mod  


 


Toxic Vacuolation   Slight  


 


Platelet Estimate


  


  


  Adequate


(ADEQUATE) 


 


 


Polychromasia   Slight  


 


Anisocytosis   Mod  


 


Prothrombin Time


  


  


  44.2 SEC


(11.7-14.0) 


 


 


Prothromb Time International


Ratio 


  


  5.1 (0.8-1.1) 


  


 


 


Activated Partial


Thromboplast Time 


  


  69 SEC (24-38) 


  


 


 


Sodium Level


  


  


  140 mmol/L


(136-145) 


 


 


Potassium Level


  


  


  3.4 mmol/L


(3.5-5.1) 


 


 


Chloride Level


  


  


  98 mmol/L


() 


 


 


Carbon Dioxide Level


  


  


  30 mmol/L


(21-32) 


 


 


Anion Gap   12 (6-14)  


 


Blood Urea Nitrogen


  


  


  35 mg/dL


(7-20) 


 


 


Creatinine


  


  


  6.4 mg/dL


(0.6-1.0) 


 


 


Estimated GFR


(Cockcroft-Gault) 


  


  7.6 


  


 


 


Glucose Level


  


  


  112 mg/dL


(70-99) 


 


 


Calcium Level


  


  


  9.8 mg/dL


(8.5-10.1) 


 


 


Total Bilirubin


  


  


  0.3 mg/dL


(0.2-1.0) 


 


 


Direct Bilirubin


  


  


  0.1 mg/dL


(0.0-0.2) 


 


 


Aspartate Amino Transf


(AST/SGOT) 


  


  29 U/L (15-37) 


  


 


 


Alanine Aminotransferase


(ALT/SGPT) 


  


  < 6 U/L


(14-59) 


 


 


Alkaline Phosphatase


  


  


  199 U/L


() 


 


 


Creatine Kinase


  


  


  92 U/L


() 


 


 


Creatine Kinase MB (Mass)


  


  


  0.7 ng/mL


(0.0-3.6) 


 


 


Creatine Kinase MB Relative


Index 


  


  0.8 % (0-4) 


  


 


 


Total Protein


  


  


  7.8 g/dL


(6.4-8.2) 


 


 


Albumin


  


  


  3.3 g/dL


(3.4-5.0) 


 


 


Test


  10/18/17


07:00 


  


  


 


 


White Blood Count


  15.5 x10^3/uL


(4.0-11.0) 


  


  


 


 


Red Blood Count


  3.84 x10^6/uL


(3.50-5.40) 


  


  


 


 


Hemoglobin


  10.7 g/dL


(12.0-15.5) 


  


  


 


 


Hematocrit


  33.9 %


(36.0-47.0) 


  


  


 


 


Mean Corpuscular Volume 88 fL ()    


 


Mean Corpuscular Hemoglobin 28 pg (25-35)    


 


Mean Corpuscular Hemoglobin


Concent 32 g/dL


(31-37) 


  


  


 


 


Red Cell Distribution Width


  26.7 %


(11.5-14.5) 


  


  


 


 


Platelet Count


  271 x10^3/uL


(140-400) 


  


  


 


 


Neutrophils (%) (Auto) 76 % (31-73)    


 


Lymphocytes (%) (Auto) 13 % (24-48)    


 


Monocytes (%) (Auto) 7 % (0-9)    


 


Eosinophils (%) (Auto) 4 % (0-3)    


 


Basophils (%) (Auto) 1 % (0-3)    


 


Neutrophils # (Auto)


  11.8 x10^3uL


(1.8-7.7) 


  


  


 


 


Lymphocytes # (Auto)


  2.0 x10^3/uL


(1.0-4.8) 


  


  


 


 


Monocytes # (Auto)


  1.0 x10^3/uL


(0.0-1.1) 


  


  


 


 


Eosinophils # (Auto)


  0.5 x10^3/uL


(0.0-0.7) 


  


  


 


 


Basophils # (Auto)


  0.1 x10^3/uL


(0.0-0.2) 


  


  


 


 


Prothrombin Time


  39.7 SEC


(11.7-14.0) 


  


  


 


 


Prothromb Time International


Ratio 4.5 (0.8-1.1) 


  


  


  


 


 


Sodium Level


  136 mmol/L


(136-145) 


  


  


 


 


Potassium Level


  4.2 mmol/L


(3.5-5.1) 


  


  


 


 


Chloride Level


  98 mmol/L


() 


  


  


 


 


Carbon Dioxide Level


  26 mmol/L


(21-32) 


  


  


 


 


Anion Gap 12 (6-14)    


 


Blood Urea Nitrogen


  38 mg/dL


(7-20) 


  


  


 


 


Creatinine


  7.0 mg/dL


(0.6-1.0) 


  


  


 


 


Estimated GFR


(Cockcroft-Gault) 6.9 


  


  


  


 


 


Glucose Level


  117 mg/dL


(70-99) 


  


  


 


 


Calcium Level


  9.2 mg/dL


(8.5-10.1) 


  


  


 


 


Troponin I Quantitative


  0.019 ng/mL


(0.000-0.055) 


  


  


 











Images


Images


Impression:


1.  Limited by lack of intravenous contrast.


2.  No convincing bowel obstruction or inflammation is identified.


3.  Colonic diverticulosis.


4.  Fat-containing umbilical hernia demonstrating inflammatory change.


5.  Superior endplate L1 compression fracture, age-indeterminate.











KATIE HOLLINGSWORTH MD Oct 18, 2017 10:36

## 2017-10-18 NOTE — PDOC
PROGRESS NOTES


Chief Complaint


Chief Complaint


1. Hematochezia in the back ground of high INR (5)


2. COumadin toxicity 


3. Hx of clots in indwelling RT subclavian HD cath


4, ESRD on HD TTHS


5. Lefft AV fistula site, immature yet


6. Anemia of CKD


7. HTN, asthma, DM, dyslipidemia - all chronic, stable


8. Obesity BMI 33.3


9. 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





History of Present Illness


History of Present Illness


INR 4 plus, hematochezia continues somwhat per staff


CT :


Impression:


1.  Limited by lack of intravenous contrast.


2.  No convincing bowel obstruction or inflammation is identified.


3.  Colonic diverticulosis.


4.  Fat-containing umbilical hernia demonstrating inflammatory change.


5.  Superior endplate L1 compression fracture, age-indeterminate.





PALn:


Keep clears


Await GI rounds


MOre vitamin K today


INR tmr


Hemodynamically stable, otherwise


HD per renal





dw [facility] I fixed her home meds, some discrepancies





Vitals


Vitals





Vital Signs








  Date Time  Temp Pulse Resp B/P (MAP) Pulse Ox O2 Delivery O2 Flow Rate FiO2


 


10/18/17 09:00  71  113/59    


 


10/18/17 07:22      Room Air  


 


10/18/17 07:00 97.9  20  96   





 97.9       











Physical Exam


General:  Alert, Oriented X3, Cooperative, No acute distress


Lungs:  Clear


Abdomen:  Normal bowel sounds, Soft, No tenderness, No hepatosplenomegaly, No 

masses


Extremities:  No clubbing, No cyanosis, No edema, Normal pulses, No tenderness/

swelling


Skin:  No rashes, No breakdown, No significant lesion





Labs


LABS





Laboratory Tests








Test


  10/17/17


16:30 10/17/17


16:50 10/17/17


17:30 10/18/17


00:35


 


Troponin I Quantitative


  < 0.017 ng/mL


(0.000-0.055) 


  


  < 0.017 ng/mL


(0.000-0.055)


 


Stool Occult Blood  Positive (NEG)   


 


White Blood Count


  


  


  19.0 x10^3/uL


(4.0-11.0) 


 


 


Red Blood Count


  


  


  4.45 x10^6/uL


(3.50-5.40) 


 


 


Hemoglobin


  


  


  12.3 g/dL


(12.0-15.5) 


 


 


Hematocrit


  


  


  39.1 %


(36.0-47.0) 


 


 


Mean Corpuscular Volume   88 fL ()  


 


Mean Corpuscular Hemoglobin   28 pg (25-35)  


 


Mean Corpuscular Hemoglobin


Concent 


  


  32 g/dL


(31-37) 


 


 


Red Cell Distribution Width


  


  


  26.4 %


(11.5-14.5) 


 


 


Platelet Count


  


  


  259 x10^3/uL


(140-400) 


 


 


Neutrophils (%) (Auto)   83 % (31-73)  


 


Lymphocytes (%) (Auto)   7 % (24-48)  


 


Monocytes (%) (Auto)   8 % (0-9)  


 


Eosinophils (%) (Auto)   2 % (0-3)  


 


Basophils (%) (Auto)   1 % (0-3)  


 


Neutrophils # (Auto)


  


  


  15.8 x10^3uL


(1.8-7.7) 


 


 


Lymphocytes # (Auto)


  


  


  1.3 x10^3/uL


(1.0-4.8) 


 


 


Monocytes # (Auto)


  


  


  1.5 x10^3/uL


(0.0-1.1) 


 


 


Eosinophils # (Auto)


  


  


  0.4 x10^3/uL


(0.0-0.7) 


 


 


Basophils # (Auto)


  


  


  0.1 x10^3/uL


(0.0-0.2) 


 


 


Segmented Neutrophils %   72 % (35-66)  


 


Band Neutrophils %   6 % (0-9)  


 


Lymphocytes %   10 % (24-48)  


 


Monocytes %   7 % (0-10)  


 


Eosinophils %   4 % (0-5)  


 


Basophils %   1 % (0-3)  


 


Toxic Granulation   Mod  


 


Toxic Vacuolation   Slight  


 


Platelet Estimate


  


  


  Adequate


(ADEQUATE) 


 


 


Polychromasia   Slight  


 


Anisocytosis   Mod  


 


Prothrombin Time


  


  


  44.2 SEC


(11.7-14.0) 


 


 


Prothromb Time International


Ratio 


  


  5.1 (0.8-1.1) 


  


 


 


Activated Partial


Thromboplast Time 


  


  69 SEC (24-38) 


  


 


 


Sodium Level


  


  


  140 mmol/L


(136-145) 


 


 


Potassium Level


  


  


  3.4 mmol/L


(3.5-5.1) 


 


 


Chloride Level


  


  


  98 mmol/L


() 


 


 


Carbon Dioxide Level


  


  


  30 mmol/L


(21-32) 


 


 


Anion Gap   12 (6-14)  


 


Blood Urea Nitrogen


  


  


  35 mg/dL


(7-20) 


 


 


Creatinine


  


  


  6.4 mg/dL


(0.6-1.0) 


 


 


Estimated GFR


(Cockcroft-Gault) 


  


  7.6 


  


 


 


Glucose Level


  


  


  112 mg/dL


(70-99) 


 


 


Calcium Level


  


  


  9.8 mg/dL


(8.5-10.1) 


 


 


Total Bilirubin


  


  


  0.3 mg/dL


(0.2-1.0) 


 


 


Direct Bilirubin


  


  


  0.1 mg/dL


(0.0-0.2) 


 


 


Aspartate Amino Transf


(AST/SGOT) 


  


  29 U/L (15-37) 


  


 


 


Alanine Aminotransferase


(ALT/SGPT) 


  


  < 6 U/L


(14-59) 


 


 


Alkaline Phosphatase


  


  


  199 U/L


() 


 


 


Creatine Kinase


  


  


  92 U/L


() 


 


 


Creatine Kinase MB (Mass)


  


  


  0.7 ng/mL


(0.0-3.6) 


 


 


Creatine Kinase MB Relative


Index 


  


  0.8 % (0-4) 


  


 


 


Total Protein


  


  


  7.8 g/dL


(6.4-8.2) 


 


 


Albumin


  


  


  3.3 g/dL


(3.4-5.0) 


 


 


Test


  10/18/17


07:00 


  


  


 


 


White Blood Count


  15.5 x10^3/uL


(4.0-11.0) 


  


  


 


 


Red Blood Count


  3.84 x10^6/uL


(3.50-5.40) 


  


  


 


 


Hemoglobin


  10.7 g/dL


(12.0-15.5) 


  


  


 


 


Hematocrit


  33.9 %


(36.0-47.0) 


  


  


 


 


Mean Corpuscular Volume 88 fL ()    


 


Mean Corpuscular Hemoglobin 28 pg (25-35)    


 


Mean Corpuscular Hemoglobin


Concent 32 g/dL


(31-37) 


  


  


 


 


Red Cell Distribution Width


  26.7 %


(11.5-14.5) 


  


  


 


 


Platelet Count


  271 x10^3/uL


(140-400) 


  


  


 


 


Neutrophils (%) (Auto) 76 % (31-73)    


 


Lymphocytes (%) (Auto) 13 % (24-48)    


 


Monocytes (%) (Auto) 7 % (0-9)    


 


Eosinophils (%) (Auto) 4 % (0-3)    


 


Basophils (%) (Auto) 1 % (0-3)    


 


Neutrophils # (Auto)


  11.8 x10^3uL


(1.8-7.7) 


  


  


 


 


Lymphocytes # (Auto)


  2.0 x10^3/uL


(1.0-4.8) 


  


  


 


 


Monocytes # (Auto)


  1.0 x10^3/uL


(0.0-1.1) 


  


  


 


 


Eosinophils # (Auto)


  0.5 x10^3/uL


(0.0-0.7) 


  


  


 


 


Basophils # (Auto)


  0.1 x10^3/uL


(0.0-0.2) 


  


  


 


 


Prothrombin Time


  39.7 SEC


(11.7-14.0) 


  


  


 


 


Prothromb Time International


Ratio 4.5 (0.8-1.1) 


  


  


  


 


 


Sodium Level


  136 mmol/L


(136-145) 


  


  


 


 


Potassium Level


  4.2 mmol/L


(3.5-5.1) 


  


  


 


 


Chloride Level


  98 mmol/L


() 


  


  


 


 


Carbon Dioxide Level


  26 mmol/L


(21-32) 


  


  


 


 


Anion Gap 12 (6-14)    


 


Blood Urea Nitrogen


  38 mg/dL


(7-20) 


  


  


 


 


Creatinine


  7.0 mg/dL


(0.6-1.0) 


  


  


 


 


Estimated GFR


(Cockcroft-Gault) 6.9 


  


  


  


 


 


Glucose Level


  117 mg/dL


(70-99) 


  


  


 


 


Calcium Level


  9.2 mg/dL


(8.5-10.1) 


  


  


 


 


Troponin I Quantitative


  0.019 ng/mL


(0.000-0.055) 


  


  


 











Review of Systems


Review of Systems


some RT lower abd pain, no soa, cp, emesis





Assessment and Plan


Assessmemt and Plan


Problems


Medical Problems:


(1) Bloody diarrhea


Status: Acute  








Problems:  





Comment


Review of Relevant


I have reviewed the following items pauline (where applicable) has been applied.


Labs





Laboratory Tests








Test


  10/17/17


16:30 10/17/17


16:50 10/17/17


17:30 10/18/17


00:35


 


Troponin I Quantitative


  < 0.017 ng/mL


(0.000-0.055) 


  


  < 0.017 ng/mL


(0.000-0.055)


 


Stool Occult Blood  Positive (NEG)   


 


White Blood Count


  


  


  19.0 x10^3/uL


(4.0-11.0) 


 


 


Red Blood Count


  


  


  4.45 x10^6/uL


(3.50-5.40) 


 


 


Hemoglobin


  


  


  12.3 g/dL


(12.0-15.5) 


 


 


Hematocrit


  


  


  39.1 %


(36.0-47.0) 


 


 


Mean Corpuscular Volume   88 fL ()  


 


Mean Corpuscular Hemoglobin   28 pg (25-35)  


 


Mean Corpuscular Hemoglobin


Concent 


  


  32 g/dL


(31-37) 


 


 


Red Cell Distribution Width


  


  


  26.4 %


(11.5-14.5) 


 


 


Platelet Count


  


  


  259 x10^3/uL


(140-400) 


 


 


Neutrophils (%) (Auto)   83 % (31-73)  


 


Lymphocytes (%) (Auto)   7 % (24-48)  


 


Monocytes (%) (Auto)   8 % (0-9)  


 


Eosinophils (%) (Auto)   2 % (0-3)  


 


Basophils (%) (Auto)   1 % (0-3)  


 


Neutrophils # (Auto)


  


  


  15.8 x10^3uL


(1.8-7.7) 


 


 


Lymphocytes # (Auto)


  


  


  1.3 x10^3/uL


(1.0-4.8) 


 


 


Monocytes # (Auto)


  


  


  1.5 x10^3/uL


(0.0-1.1) 


 


 


Eosinophils # (Auto)


  


  


  0.4 x10^3/uL


(0.0-0.7) 


 


 


Basophils # (Auto)


  


  


  0.1 x10^3/uL


(0.0-0.2) 


 


 


Segmented Neutrophils %   72 % (35-66)  


 


Band Neutrophils %   6 % (0-9)  


 


Lymphocytes %   10 % (24-48)  


 


Monocytes %   7 % (0-10)  


 


Eosinophils %   4 % (0-5)  


 


Basophils %   1 % (0-3)  


 


Toxic Granulation   Mod  


 


Toxic Vacuolation   Slight  


 


Platelet Estimate


  


  


  Adequate


(ADEQUATE) 


 


 


Polychromasia   Slight  


 


Anisocytosis   Mod  


 


Prothrombin Time


  


  


  44.2 SEC


(11.7-14.0) 


 


 


Prothromb Time International


Ratio 


  


  5.1 (0.8-1.1) 


  


 


 


Activated Partial


Thromboplast Time 


  


  69 SEC (24-38) 


  


 


 


Sodium Level


  


  


  140 mmol/L


(136-145) 


 


 


Potassium Level


  


  


  3.4 mmol/L


(3.5-5.1) 


 


 


Chloride Level


  


  


  98 mmol/L


() 


 


 


Carbon Dioxide Level


  


  


  30 mmol/L


(21-32) 


 


 


Anion Gap   12 (6-14)  


 


Blood Urea Nitrogen


  


  


  35 mg/dL


(7-20) 


 


 


Creatinine


  


  


  6.4 mg/dL


(0.6-1.0) 


 


 


Estimated GFR


(Cockcroft-Gault) 


  


  7.6 


  


 


 


Glucose Level


  


  


  112 mg/dL


(70-99) 


 


 


Calcium Level


  


  


  9.8 mg/dL


(8.5-10.1) 


 


 


Total Bilirubin


  


  


  0.3 mg/dL


(0.2-1.0) 


 


 


Direct Bilirubin


  


  


  0.1 mg/dL


(0.0-0.2) 


 


 


Aspartate Amino Transf


(AST/SGOT) 


  


  29 U/L (15-37) 


  


 


 


Alanine Aminotransferase


(ALT/SGPT) 


  


  < 6 U/L


(14-59) 


 


 


Alkaline Phosphatase


  


  


  199 U/L


() 


 


 


Creatine Kinase


  


  


  92 U/L


() 


 


 


Creatine Kinase MB (Mass)


  


  


  0.7 ng/mL


(0.0-3.6) 


 


 


Creatine Kinase MB Relative


Index 


  


  0.8 % (0-4) 


  


 


 


Total Protein


  


  


  7.8 g/dL


(6.4-8.2) 


 


 


Albumin


  


  


  3.3 g/dL


(3.4-5.0) 


 


 


Test


  10/18/17


07:00 


  


  


 


 


White Blood Count


  15.5 x10^3/uL


(4.0-11.0) 


  


  


 


 


Red Blood Count


  3.84 x10^6/uL


(3.50-5.40) 


  


  


 


 


Hemoglobin


  10.7 g/dL


(12.0-15.5) 


  


  


 


 


Hematocrit


  33.9 %


(36.0-47.0) 


  


  


 


 


Mean Corpuscular Volume 88 fL ()    


 


Mean Corpuscular Hemoglobin 28 pg (25-35)    


 


Mean Corpuscular Hemoglobin


Concent 32 g/dL


(31-37) 


  


  


 


 


Red Cell Distribution Width


  26.7 %


(11.5-14.5) 


  


  


 


 


Platelet Count


  271 x10^3/uL


(140-400) 


  


  


 


 


Neutrophils (%) (Auto) 76 % (31-73)    


 


Lymphocytes (%) (Auto) 13 % (24-48)    


 


Monocytes (%) (Auto) 7 % (0-9)    


 


Eosinophils (%) (Auto) 4 % (0-3)    


 


Basophils (%) (Auto) 1 % (0-3)    


 


Neutrophils # (Auto)


  11.8 x10^3uL


(1.8-7.7) 


  


  


 


 


Lymphocytes # (Auto)


  2.0 x10^3/uL


(1.0-4.8) 


  


  


 


 


Monocytes # (Auto)


  1.0 x10^3/uL


(0.0-1.1) 


  


  


 


 


Eosinophils # (Auto)


  0.5 x10^3/uL


(0.0-0.7) 


  


  


 


 


Basophils # (Auto)


  0.1 x10^3/uL


(0.0-0.2) 


  


  


 


 


Prothrombin Time


  39.7 SEC


(11.7-14.0) 


  


  


 


 


Prothromb Time International


Ratio 4.5 (0.8-1.1) 


  


  


  


 


 


Sodium Level


  136 mmol/L


(136-145) 


  


  


 


 


Potassium Level


  4.2 mmol/L


(3.5-5.1) 


  


  


 


 


Chloride Level


  98 mmol/L


() 


  


  


 


 


Carbon Dioxide Level


  26 mmol/L


(21-32) 


  


  


 


 


Anion Gap 12 (6-14)    


 


Blood Urea Nitrogen


  38 mg/dL


(7-20) 


  


  


 


 


Creatinine


  7.0 mg/dL


(0.6-1.0) 


  


  


 


 


Estimated GFR


(Cockcroft-Gault) 6.9 


  


  


  


 


 


Glucose Level


  117 mg/dL


(70-99) 


  


  


 


 


Calcium Level


  9.2 mg/dL


(8.5-10.1) 


  


  


 


 


Troponin I Quantitative


  0.019 ng/mL


(0.000-0.055) 


  


  


 








Laboratory Tests








Test


  10/17/17


16:30 10/17/17


16:50 10/17/17


17:30 10/18/17


00:35


 


Troponin I Quantitative


  < 0.017 ng/mL


(0.000-0.055) 


  


  < 0.017 ng/mL


(0.000-0.055)


 


Stool Occult Blood  Positive (NEG)   


 


White Blood Count


  


  


  19.0 x10^3/uL


(4.0-11.0) 


 


 


Red Blood Count


  


  


  4.45 x10^6/uL


(3.50-5.40) 


 


 


Hemoglobin


  


  


  12.3 g/dL


(12.0-15.5) 


 


 


Hematocrit


  


  


  39.1 %


(36.0-47.0) 


 


 


Mean Corpuscular Volume   88 fL ()  


 


Mean Corpuscular Hemoglobin   28 pg (25-35)  


 


Mean Corpuscular Hemoglobin


Concent 


  


  32 g/dL


(31-37) 


 


 


Red Cell Distribution Width


  


  


  26.4 %


(11.5-14.5) 


 


 


Platelet Count


  


  


  259 x10^3/uL


(140-400) 


 


 


Neutrophils (%) (Auto)   83 % (31-73)  


 


Lymphocytes (%) (Auto)   7 % (24-48)  


 


Monocytes (%) (Auto)   8 % (0-9)  


 


Eosinophils (%) (Auto)   2 % (0-3)  


 


Basophils (%) (Auto)   1 % (0-3)  


 


Neutrophils # (Auto)


  


  


  15.8 x10^3uL


(1.8-7.7) 


 


 


Lymphocytes # (Auto)


  


  


  1.3 x10^3/uL


(1.0-4.8) 


 


 


Monocytes # (Auto)


  


  


  1.5 x10^3/uL


(0.0-1.1) 


 


 


Eosinophils # (Auto)


  


  


  0.4 x10^3/uL


(0.0-0.7) 


 


 


Basophils # (Auto)


  


  


  0.1 x10^3/uL


(0.0-0.2) 


 


 


Segmented Neutrophils %   72 % (35-66)  


 


Band Neutrophils %   6 % (0-9)  


 


Lymphocytes %   10 % (24-48)  


 


Monocytes %   7 % (0-10)  


 


Eosinophils %   4 % (0-5)  


 


Basophils %   1 % (0-3)  


 


Toxic Granulation   Mod  


 


Toxic Vacuolation   Slight  


 


Platelet Estimate


  


  


  Adequate


(ADEQUATE) 


 


 


Polychromasia   Slight  


 


Anisocytosis   Mod  


 


Prothrombin Time


  


  


  44.2 SEC


(11.7-14.0) 


 


 


Prothromb Time International


Ratio 


  


  5.1 (0.8-1.1) 


  


 


 


Activated Partial


Thromboplast Time 


  


  69 SEC (24-38) 


  


 


 


Sodium Level


  


  


  140 mmol/L


(136-145) 


 


 


Potassium Level


  


  


  3.4 mmol/L


(3.5-5.1) 


 


 


Chloride Level


  


  


  98 mmol/L


() 


 


 


Carbon Dioxide Level


  


  


  30 mmol/L


(21-32) 


 


 


Anion Gap   12 (6-14)  


 


Blood Urea Nitrogen


  


  


  35 mg/dL


(7-20) 


 


 


Creatinine


  


  


  6.4 mg/dL


(0.6-1.0) 


 


 


Estimated GFR


(Cockcroft-Gault) 


  


  7.6 


  


 


 


Glucose Level


  


  


  112 mg/dL


(70-99) 


 


 


Calcium Level


  


  


  9.8 mg/dL


(8.5-10.1) 


 


 


Total Bilirubin


  


  


  0.3 mg/dL


(0.2-1.0) 


 


 


Direct Bilirubin


  


  


  0.1 mg/dL


(0.0-0.2) 


 


 


Aspartate Amino Transf


(AST/SGOT) 


  


  29 U/L (15-37) 


  


 


 


Alanine Aminotransferase


(ALT/SGPT) 


  


  < 6 U/L


(14-59) 


 


 


Alkaline Phosphatase


  


  


  199 U/L


() 


 


 


Creatine Kinase


  


  


  92 U/L


() 


 


 


Creatine Kinase MB (Mass)


  


  


  0.7 ng/mL


(0.0-3.6) 


 


 


Creatine Kinase MB Relative


Index 


  


  0.8 % (0-4) 


  


 


 


Total Protein


  


  


  7.8 g/dL


(6.4-8.2) 


 


 


Albumin


  


  


  3.3 g/dL


(3.4-5.0) 


 


 


Test


  10/18/17


07:00 


  


  


 


 


White Blood Count


  15.5 x10^3/uL


(4.0-11.0) 


  


  


 


 


Red Blood Count


  3.84 x10^6/uL


(3.50-5.40) 


  


  


 


 


Hemoglobin


  10.7 g/dL


(12.0-15.5) 


  


  


 


 


Hematocrit


  33.9 %


(36.0-47.0) 


  


  


 


 


Mean Corpuscular Volume 88 fL ()    


 


Mean Corpuscular Hemoglobin 28 pg (25-35)    


 


Mean Corpuscular Hemoglobin


Concent 32 g/dL


(31-37) 


  


  


 


 


Red Cell Distribution Width


  26.7 %


(11.5-14.5) 


  


  


 


 


Platelet Count


  271 x10^3/uL


(140-400) 


  


  


 


 


Neutrophils (%) (Auto) 76 % (31-73)    


 


Lymphocytes (%) (Auto) 13 % (24-48)    


 


Monocytes (%) (Auto) 7 % (0-9)    


 


Eosinophils (%) (Auto) 4 % (0-3)    


 


Basophils (%) (Auto) 1 % (0-3)    


 


Neutrophils # (Auto)


  11.8 x10^3uL


(1.8-7.7) 


  


  


 


 


Lymphocytes # (Auto)


  2.0 x10^3/uL


(1.0-4.8) 


  


  


 


 


Monocytes # (Auto)


  1.0 x10^3/uL


(0.0-1.1) 


  


  


 


 


Eosinophils # (Auto)


  0.5 x10^3/uL


(0.0-0.7) 


  


  


 


 


Basophils # (Auto)


  0.1 x10^3/uL


(0.0-0.2) 


  


  


 


 


Prothrombin Time


  39.7 SEC


(11.7-14.0) 


  


  


 


 


Prothromb Time International


Ratio 4.5 (0.8-1.1) 


  


  


  


 


 


Sodium Level


  136 mmol/L


(136-145) 


  


  


 


 


Potassium Level


  4.2 mmol/L


(3.5-5.1) 


  


  


 


 


Chloride Level


  98 mmol/L


() 


  


  


 


 


Carbon Dioxide Level


  26 mmol/L


(21-32) 


  


  


 


 


Anion Gap 12 (6-14)    


 


Blood Urea Nitrogen


  38 mg/dL


(7-20) 


  


  


 


 


Creatinine


  7.0 mg/dL


(0.6-1.0) 


  


  


 


 


Estimated GFR


(Cockcroft-Gault) 6.9 


  


  


  


 


 


Glucose Level


  117 mg/dL


(70-99) 


  


  


 


 


Calcium Level


  9.2 mg/dL


(8.5-10.1) 


  


  


 


 


Troponin I Quantitative


  0.019 ng/mL


(0.000-0.055) 


  


  


 








Medications





Current Medications


Ondansetron HCl (Zofran) 4 mg PRN Q8HRS  PRN IV NAUSEA/VOMITING;  Start 10/17/

17 at 18:45;  Stop 10/18/17 at 18:44


Phytonadione (Vitamin K Ampule) 5 mg 1X  ONCE SQ  Last administered on 10/17/

17at 19:50;  Start 10/17/17 at 19:45;  Stop 10/17/17 at 19:46;  Status DC


Acetaminophen/ Codeine Phosphate (Tylenol #3) 1 tab PRN Q8HRS  PRN PO PAIN;  

Start 10/17/17 at 19:15


Amlodipine Besylate (Norvasc) 10 mg DAILY PO ;  Start 10/18/17 at 09:00;  Stop 

10/18/17 at 09:41;  Status DC


Atenolol (Tenormin) 50 mg DAILY PO ;  Start 10/18/17 at 09:00;  Stop 10/18/17 

at 09:41;  Status DC


Atorvastatin Calcium (Lipitor) 40 mg QHS PO  Last administered on 10/17/17at 23:

10;  Start 10/17/17 at 21:00


Calcium Carbonate/ Glycine (Tums) 500 mg BID PO  Last administered on 10/17/

17at 23:09;  Start 10/17/17 at 21:00


Vitamin D (Vitamin D3) 2,000 unit DAILY PO  Last administered on 10/18/17at 09:

35;  Start 10/18/17 at 09:00


Clonidine HCl (Catapres Tts-3) 1 patch WEEKLY TD ;  Start 10/18/17 at 09:00;  

Stop 10/18/17 at 09:41;  Status DC


Darbepoetin Mo (Aranesp) 100 mcg WEEKLYHS SQ ;  Start 10/23/17 at 21:00


Diclofenac Sodium (Voltaren) 4 scarlett QID TP  Last administered on 10/18/17at 08:01

;  Start 10/17/17 at 21:00


Doxazosin Mesylate (Cardura) 4 mg DAILY PO ;  Start 10/18/17 at 09:00;  Stop 10/

18/17 at 09:41;  Status DC


Ferrous Sulfate (Feosol) 325 mg DAILY PO ;  Start 10/18/17 at 09:00


Acetaminophen/ Hydrocodone Bitart (Lortab 5/325) 1 tab PRN Q6HRS  PRN PO PAIN;  

Start 10/17/17 at 19:15


Montelukast Sodium (Singulair) 10 mg DAILY PO ;  Start 10/18/17 at 09:00


Polyethylene Glycol (miraLAX PACKET) 17 gm DAILY PO ;  Start 10/18/17 at 09:00


Non-Formulary Medication 2 puff PRN Q4-6HRS IH ;  Start 10/17/17 at 19:15;  

Stop 10/17/17 at 19:43;  Status DC


Cyclobenzaprine HCl (Flexeril) 5 mg TID PO  Last administered on 10/17/17at 23:

10;  Start 10/17/17 at 21:00;  Stop 10/18/17 at 09:41;  Status DC


Pantoprazole Sodium (Protonix) 40 mg DAILYAC PO  Last administered on 10/18/

17at 08:00;  Start 10/18/17 at 07:30


Cetirizine HCl (ZyrTEC) 10 mg DAILY PO  Last administered on 10/18/17at 09:35;  

Start 10/18/17 at 09:00


Hydralazine HCl (Apresoline) 100 mg TID PO  Last administered on 10/17/17at 23:

09;  Start 10/17/17 at 21:00


Metolazone (Zaroxolyn) 5 mg DAILY PO ;  Start 10/18/17 at 09:00


Non-Formulary Medication 2 puff BID IH ;  Start 10/17/17 at 21:00;  Stop 10/17/

17 at 21:00;  Status DC


Nystatin (Mycostatin) 1 scarlett TID TP ;  Start 10/17/17 at 21:00


Non-Formulary Medication 2 mg TID  PRN PO MUSCLE SPASMS;  Start 10/17/17 at 19:

15;  Stop 10/17/17 at 19:38;  Status DC


Oxybutynin Chloride (Ditropan) 5 mg [id number] PO  Last administered on 10/17/17at 

23:08;  Start 10/17/17 at 21:00


Hydralazine HCl (Apresoline Inj) 10 mg PRN Q4HRS  PRN IVP ELEVATED BP, SEE 

COMMENTS;  Start 10/17/17 at 19:15


Diphenhydramine HCl (Benadryl) 25 mg PRN QHS  PRN PO INSOMNIA;  Start 10/17/17 

at 19:15


Iohexol (Omnipaque 240 Mg/ml) 30 ml 1X  ONCE PO  Last administered on 10/17/

17at 19:30;  Start 10/17/17 at 19:30;  Stop 10/17/17 at 19:39;  Status DC


Albuterol Sulfate (Ventolin Neb Soln) 2.5 mg PRN Q4HRS  PRN NEB SHORTNESS OF 

BREATH;  Start 10/17/17 at 19:45


Albuterol Sulfate (Ventolin Neb Soln) 2.5 mg RTQID NEB  Last administered on 10/

18/17at 07:18;  Start 10/17/17 at 20:00


Budesonide (Pulmicort) 0.5 mg RTBID NEB  Last administered on 10/18/17at 07:18;

  Start 10/17/17 at 20:00





Active Scripts


Active


Aranesp Syringe (Darbepoetin Mo In Polysorbat) 100 Mcg/0.5 Ml Disp.syrin 100 

Mcg SQ WEEKLYHS


Metolazone 5 Mg Tablet 5 Mg PO DAILY


Tenormin (Atenolol) 50 Mg Tablet 50 Mg PO DAILY


Reported


Calcium Acetate 667 Mg Tablet 2,001 Mg PO TIDWMEALS


Tramadol Hcl 50 Mg Tablet 50 Mg PO PRN Q6-8HRS PRN


Warfarin Sodium 5 Mg Tablet 5 Mg PO DAILY


Allegra Allergy (Fexofenadine Hcl) 180 Mg Tablet 180 Mg PO DAILY


Vitamin D3 (Cholecalciferol (Vitamin D3)) 1,000 Unit Tablet 2,000 Unit PO DAILY


Proair Hfa Inhaler (Albuterol Sulfate) 8.5 Gm Hfa.aer.ad 2 Puff IH PRN Q4-6HRS


Dulera 200 Mcg/5 Mcg Inhaler (Mometasone/Formoterol) 13 Gm Hfa.aer.ad 2 Puff IH 

BID


Esomeprazole Capsule (Esomeprazole Strontium) 40 Mg Capsule.dr 40 Mg PO DAILYAC


Voltaren (Diclofenac Sodium) 100 Gm Gel..gram. 4 Gm TP QID


Lipitor (Atorvastatin Calcium) 40 Mg Tablet 1 Tab PO QHS


Hydralazine Hcl 100 Mg Tablet 1 Tab PO TID


Vitals/I & O





Vital Sign - Last 24 Hours








 10/17/17 10/17/17 10/17/17 10/17/17





 16:15 17:00 17:15 18:00


 


Temp 97.5   





 97.5   


 


Pulse 73 73 74 74


 


Resp 20 18 18 19


 


B/P (MAP) 108/51 (70) 103/51 (68) 100/51 (67) 183/81 (115)


 


Pulse Ox 91 98 98 96


 


O2 Delivery  Room Air Room Air Room Air


 


    





    





 10/17/17 10/17/17 10/17/17 10/17/17





 18:30 19:00 19:30 20:00


 


Pulse 72 70 70 66


 


Resp 20   


 


B/P (MAP) 135/63 (87) 140/63 (88) 137/59 (85) 126/59 (81)


 


Pulse Ox 95 95 95 95


 


O2 Delivery Room Air Room Air Room Air Room Air





 10/17/17 10/17/17 10/17/17 10/17/17





 20:30 21:00 22:30 23:00


 


Temp  98.3  97.7





  98.3  97.7


 


Pulse  67  70


 


Resp    20


 


B/P (MAP)  85/58 (67)  118/56 (76)


 


Pulse Ox  93 96 100


 


O2 Delivery Room Air Room Air Room Air Room Air


 


    





    





 10/17/17 10/18/17 10/18/17 10/18/17





 23:09 03:00 07:00 07:22


 


Temp  97.8 97.9 





  97.8 97.9 


 


Pulse 69 58 71 


 


Resp  20 20 


 


B/P (MAP) 118/56 96/41 (59) 113/59 (77) 


 


Pulse Ox  96 96 


 


O2 Delivery  Room Air Room Air Room Air


 


    





    





 10/18/17   





 09:00   


 


Pulse 71   


 


B/P (MAP) 113/59   

















SASKIA PIERRE MD Oct 18, 2017 10:21

## 2017-10-18 NOTE — PDOC2
GI CONSULT


Reason For Consult:


BRBPR





HPI:


HPI:


76 y/o female admitted through ER.  Ate some gumbo that sat out too long ~2 

days ago, began having diarrhea yesterday - brown stool mixed w/ red blood.  

ESRD on HD through subclavian cath w/ h/o clots, on Warfarin for this, INR 5.1 

to 4.5 after vit K.  Hgb 12.3 to 10.7 (was in 8s earlier this year), BUN 35 to 

38 w/ Cr 6.4 to 7.  Fecal occult positive.  CT shows diverticulosis and fat-

containing umbilical hernia w/ inflammation.  C Diff pending.  No h/o chronic 

diarrhea or constipation.  Never had issues w/ bleeding before.  Denies abd 

pain to me (H&P mentions this).  No n/v.  Not really sure about reflux; summary 

lists Nexium which she's not sure she's taking currently.  Normal appetite and 

stable weight.  Remote EGD normal.  Colonoscopy ~2 years ago @ [facility] reportedly 

normal, does recall h/o polyps.  No gallbladder, liver, or pancreas history.





PMH:


PMH:


HTN, HLD, asthma, GERD, colon polyps, diverticulosis, anemia, DM, ESRD on HD, 

hysterectomy





FH:


Family History:  No pertinent hx





Social History:


Smoke:  No


ALCOHOL:  none


Drugs:  None





ROS:





GEN: Denies fevers, chills, sweats


HEENT: Denies blurred vision, sore throat


CV: Denies chest pain


RESP: Denies shortness of air, cough


GI: Per HPI


: Denies hematuria, dysuria


ENDO: Denies weight changes


NEURO: Denies confusion, dizziness


MSK: Denies weakness, joint pain/swelling


SKIN: Denies jaundice, pruritus





Vitals:


Vitals:





 Vital Signs








  Date Time  Temp Pulse Resp B/P (MAP) Pulse Ox O2 Delivery O2 Flow Rate FiO2


 


10/18/17 09:00  71  113/59    


 


10/18/17 07:22      Room Air  


 


10/18/17 07:00 97.9  20  96   





 97.9       











Labs:


Labs:





Laboratory Tests








Test


  10/17/17


16:30 10/17/17


16:50 10/17/17


17:30 10/18/17


00:35


 


Troponin I Quantitative


  < 0.017 ng/mL


(0.000-0.055) 


  


  < 0.017 ng/mL


(0.000-0.055)


 


Stool Occult Blood  Positive (NEG)   


 


White Blood Count


  


  


  19.0 x10^3/uL


(4.0-11.0) 


 


 


Red Blood Count


  


  


  4.45 x10^6/uL


(3.50-5.40) 


 


 


Hemoglobin


  


  


  12.3 g/dL


(12.0-15.5) 


 


 


Hematocrit


  


  


  39.1 %


(36.0-47.0) 


 


 


Mean Corpuscular Volume   88 fL ()  


 


Mean Corpuscular Hemoglobin   28 pg (25-35)  


 


Mean Corpuscular Hemoglobin


Concent 


  


  32 g/dL


(31-37) 


 


 


Red Cell Distribution Width


  


  


  26.4 %


(11.5-14.5) 


 


 


Platelet Count


  


  


  259 x10^3/uL


(140-400) 


 


 


Neutrophils (%) (Auto)   83 % (31-73)  


 


Lymphocytes (%) (Auto)   7 % (24-48)  


 


Monocytes (%) (Auto)   8 % (0-9)  


 


Eosinophils (%) (Auto)   2 % (0-3)  


 


Basophils (%) (Auto)   1 % (0-3)  


 


Neutrophils # (Auto)


  


  


  15.8 x10^3uL


(1.8-7.7) 


 


 


Lymphocytes # (Auto)


  


  


  1.3 x10^3/uL


(1.0-4.8) 


 


 


Monocytes # (Auto)


  


  


  1.5 x10^3/uL


(0.0-1.1) 


 


 


Eosinophils # (Auto)


  


  


  0.4 x10^3/uL


(0.0-0.7) 


 


 


Basophils # (Auto)


  


  


  0.1 x10^3/uL


(0.0-0.2) 


 


 


Segmented Neutrophils %   72 % (35-66)  


 


Band Neutrophils %   6 % (0-9)  


 


Lymphocytes %   10 % (24-48)  


 


Monocytes %   7 % (0-10)  


 


Eosinophils %   4 % (0-5)  


 


Basophils %   1 % (0-3)  


 


Toxic Granulation   Mod  


 


Toxic Vacuolation   Slight  


 


Platelet Estimate


  


  


  Adequate


(ADEQUATE) 


 


 


Polychromasia   Slight  


 


Anisocytosis   Mod  


 


Prothrombin Time


  


  


  44.2 SEC


(11.7-14.0) 


 


 


Prothromb Time International


Ratio 


  


  5.1 (0.8-1.1) 


  


 


 


Activated Partial


Thromboplast Time 


  


  69 SEC (24-38) 


  


 


 


Sodium Level


  


  


  140 mmol/L


(136-145) 


 


 


Potassium Level


  


  


  3.4 mmol/L


(3.5-5.1) 


 


 


Chloride Level


  


  


  98 mmol/L


() 


 


 


Carbon Dioxide Level


  


  


  30 mmol/L


(21-32) 


 


 


Anion Gap   12 (6-14)  


 


Blood Urea Nitrogen


  


  


  35 mg/dL


(7-20) 


 


 


Creatinine


  


  


  6.4 mg/dL


(0.6-1.0) 


 


 


Estimated GFR


(Cockcroft-Gault) 


  


  7.6 


  


 


 


Glucose Level


  


  


  112 mg/dL


(70-99) 


 


 


Calcium Level


  


  


  9.8 mg/dL


(8.5-10.1) 


 


 


Total Bilirubin


  


  


  0.3 mg/dL


(0.2-1.0) 


 


 


Direct Bilirubin


  


  


  0.1 mg/dL


(0.0-0.2) 


 


 


Aspartate Amino Transf


(AST/SGOT) 


  


  29 U/L (15-37) 


  


 


 


Alanine Aminotransferase


(ALT/SGPT) 


  


  < 6 U/L


(14-59) 


 


 


Alkaline Phosphatase


  


  


  199 U/L


() 


 


 


Creatine Kinase


  


  


  92 U/L


() 


 


 


Creatine Kinase MB (Mass)


  


  


  0.7 ng/mL


(0.0-3.6) 


 


 


Creatine Kinase MB Relative


Index 


  


  0.8 % (0-4) 


  


 


 


Total Protein


  


  


  7.8 g/dL


(6.4-8.2) 


 


 


Albumin


  


  


  3.3 g/dL


(3.4-5.0) 


 


 


Test


  10/18/17


07:00 


  


  


 


 


White Blood Count


  15.5 x10^3/uL


(4.0-11.0) 


  


  


 


 


Red Blood Count


  3.84 x10^6/uL


(3.50-5.40) 


  


  


 


 


Hemoglobin


  10.7 g/dL


(12.0-15.5) 


  


  


 


 


Hematocrit


  33.9 %


(36.0-47.0) 


  


  


 


 


Mean Corpuscular Volume 88 fL ()    


 


Mean Corpuscular Hemoglobin 28 pg (25-35)    


 


Mean Corpuscular Hemoglobin


Concent 32 g/dL


(31-37) 


  


  


 


 


Red Cell Distribution Width


  26.7 %


(11.5-14.5) 


  


  


 


 


Platelet Count


  271 x10^3/uL


(140-400) 


  


  


 


 


Neutrophils (%) (Auto) 76 % (31-73)    


 


Lymphocytes (%) (Auto) 13 % (24-48)    


 


Monocytes (%) (Auto) 7 % (0-9)    


 


Eosinophils (%) (Auto) 4 % (0-3)    


 


Basophils (%) (Auto) 1 % (0-3)    


 


Neutrophils # (Auto)


  11.8 x10^3uL


(1.8-7.7) 


  


  


 


 


Lymphocytes # (Auto)


  2.0 x10^3/uL


(1.0-4.8) 


  


  


 


 


Monocytes # (Auto)


  1.0 x10^3/uL


(0.0-1.1) 


  


  


 


 


Eosinophils # (Auto)


  0.5 x10^3/uL


(0.0-0.7) 


  


  


 


 


Basophils # (Auto)


  0.1 x10^3/uL


(0.0-0.2) 


  


  


 


 


Prothrombin Time


  39.7 SEC


(11.7-14.0) 


  


  


 


 


Prothromb Time International


Ratio 4.5 (0.8-1.1) 


  


  


  


 


 


Sodium Level


  136 mmol/L


(136-145) 


  


  


 


 


Potassium Level


  4.2 mmol/L


(3.5-5.1) 


  


  


 


 


Chloride Level


  98 mmol/L


() 


  


  


 


 


Carbon Dioxide Level


  26 mmol/L


(21-32) 


  


  


 


 


Anion Gap 12 (6-14)    


 


Blood Urea Nitrogen


  38 mg/dL


(7-20) 


  


  


 


 


Creatinine


  7.0 mg/dL


(0.6-1.0) 


  


  


 


 


Estimated GFR


(Cockcroft-Gault) 6.9 


  


  


  


 


 


Glucose Level


  117 mg/dL


(70-99) 


  


  


 


 


Calcium Level


  9.2 mg/dL


(8.5-10.1) 


  


  


 


 


Troponin I Quantitative


  0.019 ng/mL


(0.000-0.055) 


  


  


 











Allergies:


Coded Allergies:  


     NSAIDS (Non-Steroidal Anti-Inflamma (Verified  Allergy, Intermediate, 3/16/

17)


     carvedilol (Verified  Allergy, Intermediate, 2/16/15)





Medications:





Current Medications








 Medications


  (Trade)  Dose


 Ordered  Sig/Linda


 Route


 PRN Reason  Start Time


 Stop Time Status Last Admin


Dose Admin


 


 Phytonadione


  (Vitamin K


 Ampule)  5 mg  1X  ONCE


 SQ


   10/17/17 19:45


 10/17/17 19:46 DC 10/17/17 19:50


 


 


 Atorvastatin


 Calcium


  (Lipitor)  40 mg  QHS


 PO


   10/17/17 21:00


    10/17/17 23:10


 


 


 Calcium Carbonate/


 Glycine


  (Tums)  500 mg  BID


 PO


   10/17/17 21:00


    10/17/17 23:09


 


 


 Vitamin D


  (Vitamin D3)  2,000 unit  DAILY


 PO


   10/18/17 09:00


    10/18/17 09:35


 


 


 Diclofenac Sodium


  (Voltaren)  4 scarlett  QID


 TP


   10/17/17 21:00


    10/18/17 08:01


 


 


 Cyclobenzaprine


 HCl


  (Flexeril)  5 mg  TID


 PO


   10/17/17 21:00


 10/18/17 09:41 DC 10/17/17 23:10


 


 


 Pantoprazole


 Sodium


  (Protonix)  40 mg  DAILYAC


 PO


   10/18/17 07:30


    10/18/17 08:00


 


 


 Cetirizine HCl


  (ZyrTEC)  10 mg  DAILY


 PO


   10/18/17 09:00


    10/18/17 09:35


 


 


 Hydralazine HCl


  (Apresoline)  100 mg  TID


 PO


   10/17/17 21:00


    10/17/17 23:09


 


 


 Oxybutynin


 Chloride


  (Ditropan)  5 mg  [id number]


 PO


   10/17/17 21:00


    10/17/17 23:08


 


 


 Iohexol


  (Omnipaque 240


 Mg/ml)  30 ml  1X  ONCE


 PO


   10/17/17 19:30


 10/17/17 19:39 DC 10/17/17 19:30


 


 


 Albuterol Sulfate


  (Ventolin Neb


 Soln)  2.5 mg  RTQID


 NEB


   10/17/17 20:00


    10/18/17 07:18


 


 


 Budesonide


  (Pulmicort)  0.5 mg  RTBID


 NEB


   10/17/17 20:00


    10/18/17 07:18


 











Imaging:


Imaging:


CT A/P w/ oral contrast


Findings:


Evaluation of solid organs is limited by lack of intravenous contrast.


Liver, spleen, pancreas, gallbladder, and bilateral adrenal glands 

unremarkable.  No urinary stone is identified.  Right kidney demonstrates a few 

exophytic small lesions, not adequately characterized.  No bowel obstruction or 

inflammation is identified.  Appendix is without evidence of inflammation.  

Colonic diverticulosis is noted, but no convincing diverticulitis is seen.  No 

free air or free fluid is seen in the abdomen or pelvis.  Urinary bladder is 

unremarkable.  The uterus is absent.  There is a fat-containing umbilical 

hernia which demonstrates fat  stranding/inflammatory change.


Degenerative changes are present in the spine.  Superior endplate of L1 

demonstrates superior endplate compression fracture, age-indeterminate.


Impression:


1.  Limited by lack of intravenous contrast.


2.  No convincing bowel obstruction or inflammation is identified.


3.  Colonic diverticulosis.


4.  Fat-containing umbilical hernia demonstrating inflammatory change.


5.  Superior endplate L1 compression fracture, age-indeterminate.





PE:





GEN: NAD


HEENT: Atraumatic, PERRL


LUNGS: CTAB


HEART: RRR


ABD: NABS, S/ND, doesn't seem tender


EXTREMITY: No edema


SKIN: No rashes, no jaundice


NEURO/PSYCH: A & O 3





A/P:


A/P:


Bloody diarrhea, leukocytosis


-onset after eating gumbo


-diverticulosis on CT





ESRD on HD, on Warfarin, anemia, coagulopathy


-Hgb 12.3 to 10.7, h/o diarrhea, received iron infusions at dialysis





GERD


-unclear if taking acid-reducer currently, summary lists Nexium





CRC, h/o polyps


-reports normal colonoscopy @[facility] ~2 years ago





--


?infectious


Await C Diff, monitor bleeding and labs, correct INR.


Agree w/ PPI.











PASQUALE OSMAN Oct 18, 2017 10:17

## 2017-10-19 VITALS — DIASTOLIC BLOOD PRESSURE: 40 MMHG | SYSTOLIC BLOOD PRESSURE: 93 MMHG

## 2017-10-19 VITALS — SYSTOLIC BLOOD PRESSURE: 126 MMHG | DIASTOLIC BLOOD PRESSURE: 44 MMHG

## 2017-10-19 VITALS — SYSTOLIC BLOOD PRESSURE: 133 MMHG | DIASTOLIC BLOOD PRESSURE: 53 MMHG

## 2017-10-19 VITALS — DIASTOLIC BLOOD PRESSURE: 47 MMHG | SYSTOLIC BLOOD PRESSURE: 122 MMHG

## 2017-10-19 VITALS — SYSTOLIC BLOOD PRESSURE: 100 MMHG | DIASTOLIC BLOOD PRESSURE: 73 MMHG

## 2017-10-19 LAB
ALBUMIN SERPL-MCNC: 2.4 G/DL (ref 3.4–5)
ANION GAP SERPL CALC-SCNC: 9 MMOL/L (ref 6–14)
BUN SERPL-MCNC: 47 MG/DL (ref 7–20)
CALCIUM SERPL-MCNC: 8.5 MG/DL (ref 8.5–10.1)
CHLORIDE SERPL-SCNC: 97 MMOL/L (ref 98–107)
CO2 SERPL-SCNC: 28 MMOL/L (ref 21–32)
CREAT SERPL-MCNC: 9.6 MG/DL (ref 0.6–1)
GFR SERPLBLD BASED ON 1.73 SQ M-ARVRAT: 4.8 ML/MIN
GLUCOSE SERPL-MCNC: 91 MG/DL (ref 70–99)
HCT VFR BLD CALC: 31.7 % (ref 36–47)
HGB BLD-MCNC: 10.1 G/DL (ref 12–15.5)
INR PPP: 1.7 (ref 0.8–1.1)
MCHC RBC AUTO-ENTMCNC: 32 G/DL (ref 31–37)
PHOSPHATE SERPL-MCNC: 3.6 MG/DL (ref 2.6–4.7)
POTASSIUM SERPL-SCNC: 3.8 MMOL/L (ref 3.5–5.1)
PROTHROMBIN TIME: 18.7 SEC (ref 11.7–14)
SODIUM SERPL-SCNC: 134 MMOL/L (ref 136–145)

## 2017-10-19 PROCEDURE — 5A1D70Z PERFORMANCE OF URINARY FILTRATION, INTERMITTENT, LESS THAN 6 HOURS PER DAY: ICD-10-PCS | Performed by: INTERNAL MEDICINE

## 2017-10-19 RX ADMIN — NYSTATIN SCH APP: 100000 CREAM TOPICAL at 14:00

## 2017-10-19 RX ADMIN — NYSTATIN SCH APP: 100000 CREAM TOPICAL at 21:32

## 2017-10-19 RX ADMIN — Medication SCH MG: at 09:00

## 2017-10-19 RX ADMIN — VITAMIN D, TAB 1000IU (100/BT) SCH UNIT: 25 TAB at 08:01

## 2017-10-19 RX ADMIN — ALBUTEROL SULFATE SCH MG: 108 AEROSOL, METERED RESPIRATORY (INHALATION) at 10:57

## 2017-10-19 RX ADMIN — ATORVASTATIN CALCIUM SCH MG: 40 TABLET, FILM COATED ORAL at 21:32

## 2017-10-19 RX ADMIN — BUDESONIDE SCH MG: 0.5 INHALANT RESPIRATORY (INHALATION) at 19:16

## 2017-10-19 RX ADMIN — DICLOFENAC SODIUM SCH APP: 10 GEL TOPICAL at 13:26

## 2017-10-19 RX ADMIN — BUDESONIDE SCH MG: 0.5 INHALANT RESPIRATORY (INHALATION) at 07:23

## 2017-10-19 RX ADMIN — NYSTATIN SCH APP: 100000 CREAM TOPICAL at 09:00

## 2017-10-19 RX ADMIN — MONTELUKAST SODIUM SCH MG: 10 TABLET, FILM COATED ORAL at 09:00

## 2017-10-19 RX ADMIN — DICLOFENAC SODIUM SCH APP: 10 GEL TOPICAL at 21:33

## 2017-10-19 RX ADMIN — CALCIUM CARBONATE (ANTACID) CHEW TAB 500 MG SCH MG: 500 CHEW TAB at 09:00

## 2017-10-19 RX ADMIN — OXYBUTYNIN CHLORIDE SCH MG: 5 TABLET ORAL at 21:31

## 2017-10-19 RX ADMIN — PANTOPRAZOLE SODIUM SCH MG: 40 TABLET, DELAYED RELEASE ORAL at 08:01

## 2017-10-19 RX ADMIN — DICLOFENAC SODIUM SCH APP: 10 GEL TOPICAL at 17:00

## 2017-10-19 RX ADMIN — DICLOFENAC SODIUM SCH APP: 10 GEL TOPICAL at 08:01

## 2017-10-19 RX ADMIN — ALBUTEROL SULFATE SCH MG: 108 AEROSOL, METERED RESPIRATORY (INHALATION) at 19:15

## 2017-10-19 RX ADMIN — OXYBUTYNIN CHLORIDE SCH MG: 5 TABLET ORAL at 14:00

## 2017-10-19 RX ADMIN — OXYBUTYNIN CHLORIDE SCH MG: 5 TABLET ORAL at 09:00

## 2017-10-19 RX ADMIN — ALBUTEROL SULFATE SCH MG: 108 AEROSOL, METERED RESPIRATORY (INHALATION) at 16:00

## 2017-10-19 RX ADMIN — POLYETHYLENE GLYCOL 3350 SCH GM: 17 POWDER, FOR SOLUTION ORAL at 09:00

## 2017-10-19 RX ADMIN — CALCIUM CARBONATE (ANTACID) CHEW TAB 500 MG SCH MG: 500 CHEW TAB at 21:31

## 2017-10-19 RX ADMIN — CETIRIZINE HYDROCHLORIDE SCH MG: 10 TABLET, FILM COATED ORAL at 08:01

## 2017-10-19 RX ADMIN — ALBUTEROL SULFATE SCH MG: 108 AEROSOL, METERED RESPIRATORY (INHALATION) at 07:23

## 2017-10-19 NOTE — RAD
EXAM: Right upper extremity venous Doppler.



HISTORY: History of deep venous thrombosis, right chest hemodialysis catheter,

concern for thrombosis    



COMPARISON: None.



FINDINGS: Grayscale and Doppler analysis of the right upper extremity deep

venous system was performed with graded compression and augmentation. The

internal jugular, subclavian, axillary, brachial, basilic, cephalic, radial

and ulnar  veins were assessed.



There is no evidence of deep venous thrombosis.    



IMPRESSION: 

1. No evidence of deep venous thrombosis.

## 2017-10-19 NOTE — PDOC
PROGRESS NOTES


Chief Complaint


Chief Complaint


1. Hematochezia in the back ground of high INR (5)


2. COumadin toxicity 


3. Hx of clots in indwelling RT subclavian HD cath 3/2017,since then on warfarin


4, ESRD on HD TTHS


5. Left arm AV fistula site, immature yet, 3 Weeks so far


6. Anemia of CKD


7. HTN, asthma, DM, dyslipidemia - all chronic, stable


8. Obesity BMI 33.3


9. 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


cdiff +





plan:


fu with gi, renal


cont HD


check US right arm, neg DVT


hold warfarin, recommend dc when dc home tmr


monitor labs tmr, inr tmr 


plan dc tmr if no bloody stool 


on flagyl tid





History of Present Illness


History of Present Illness


 





ROS: no fever, chills, sob or chest pain





said last time BM was yesterday and no blood, but nurse said still bloody


Hb lower at 10 from 12


+ cdiff, no BM x12hs tho





Vitals


Vitals





Vital Signs








  Date Time  Temp Pulse Resp B/P (MAP) Pulse Ox O2 Delivery O2 Flow Rate FiO2


 


10/19/17 10:58     95 Room Air  


 


10/19/17 07:00 98.0 63 18 122/47 (72)    





 98.0       











Physical Exam


General:  Alert, Oriented X3, Cooperative, No acute distress


Heart:  Regular rate, Normal S1, Normal S2


Lungs:  Clear


Abdomen:  Normal bowel sounds, Soft, No tenderness, No hepatosplenomegaly, No 

masses


Extremities:  No clubbing, No cyanosis, No edema, Normal pulses, No tenderness/

swelling


Skin:  No rashes, No breakdown, No significant lesion





Labs


LABS





Laboratory Tests








Test


  10/19/17


08:15


 


Hemoglobin


  10.1 g/dL


(12.0-15.5)


 


Hematocrit


  31.7 %


(36.0-47.0)


 


Mean Corpuscular Hemoglobin


Concent 32 g/dL


(31-37)


 


Prothrombin Time


  18.7 SEC


(11.7-14.0)


 


Prothromb Time International


Ratio 1.7 (0.8-1.1) 


 


 


Sodium Level


  134 mmol/L


(136-145)


 


Potassium Level


  3.8 mmol/L


(3.5-5.1)


 


Chloride Level


  97 mmol/L


()


 


Carbon Dioxide Level


  28 mmol/L


(21-32)


 


Anion Gap 9 (6-14) 


 


Blood Urea Nitrogen


  47 mg/dL


(7-20)


 


Creatinine


  9.6 mg/dL


(0.6-1.0)


 


Estimated GFR


(Cockcroft-Gault) 4.8 


 


 


Glucose Level


  91 mg/dL


(70-99)


 


Calcium Level


  8.5 mg/dL


(8.5-10.1)


 


Phosphorus Level


  3.6 mg/dL


(2.6-4.7)


 


Albumin


  2.4 g/dL


(3.4-5.0)











Assessment and Plan


Assessmemt and Plan


Problems


Medical Problems:


(1) Bloody diarrhea


Status: Acute  








Problems:  





Comment


Review of Relevant


I have reviewed the following items pauline (where applicable) has been applied.


Labs





Laboratory Tests








Test


  10/17/17


16:30 10/17/17


16:50 10/17/17


17:30 10/18/17


00:35


 


Troponin I Quantitative


  < 0.017 ng/mL


(0.000-0.055) 


  


  < 0.017 ng/mL


(0.000-0.055)


 


Stool Occult Blood  Positive (NEG)   


 


Clostridium difficile Toxin


(PCR) 


  Positive


(Negative) 


  


 


 


White Blood Count


  


  


  19.0 x10^3/uL


(4.0-11.0) 


 


 


Red Blood Count


  


  


  4.45 x10^6/uL


(3.50-5.40) 


 


 


Hemoglobin


  


  


  12.3 g/dL


(12.0-15.5) 


 


 


Hematocrit


  


  


  39.1 %


(36.0-47.0) 


 


 


Mean Corpuscular Volume   88 fL ()  


 


Mean Corpuscular Hemoglobin   28 pg (25-35)  


 


Mean Corpuscular Hemoglobin


Concent 


  


  32 g/dL


(31-37) 


 


 


Red Cell Distribution Width


  


  


  26.4 %


(11.5-14.5) 


 


 


Platelet Count


  


  


  259 x10^3/uL


(140-400) 


 


 


Neutrophils (%) (Auto)   83 % (31-73)  


 


Lymphocytes (%) (Auto)   7 % (24-48)  


 


Monocytes (%) (Auto)   8 % (0-9)  


 


Eosinophils (%) (Auto)   2 % (0-3)  


 


Basophils (%) (Auto)   1 % (0-3)  


 


Neutrophils # (Auto)


  


  


  15.8 x10^3uL


(1.8-7.7) 


 


 


Lymphocytes # (Auto)


  


  


  1.3 x10^3/uL


(1.0-4.8) 


 


 


Monocytes # (Auto)


  


  


  1.5 x10^3/uL


(0.0-1.1) 


 


 


Eosinophils # (Auto)


  


  


  0.4 x10^3/uL


(0.0-0.7) 


 


 


Basophils # (Auto)


  


  


  0.1 x10^3/uL


(0.0-0.2) 


 


 


Segmented Neutrophils %   72 % (35-66)  


 


Band Neutrophils %   6 % (0-9)  


 


Lymphocytes %   10 % (24-48)  


 


Monocytes %   7 % (0-10)  


 


Eosinophils %   4 % (0-5)  


 


Basophils %   1 % (0-3)  


 


Toxic Granulation   Mod  


 


Toxic Vacuolation   Slight  


 


Platelet Estimate


  


  


  Adequate


(ADEQUATE) 


 


 


Polychromasia   Slight  


 


Anisocytosis   Mod  


 


Prothrombin Time


  


  


  44.2 SEC


(11.7-14.0) 


 


 


Prothromb Time International


Ratio 


  


  5.1 (0.8-1.1) 


  


 


 


Activated Partial


Thromboplast Time 


  


  69 SEC (24-38) 


  


 


 


Sodium Level


  


  


  140 mmol/L


(136-145) 


 


 


Potassium Level


  


  


  3.4 mmol/L


(3.5-5.1) 


 


 


Chloride Level


  


  


  98 mmol/L


() 


 


 


Carbon Dioxide Level


  


  


  30 mmol/L


(21-32) 


 


 


Anion Gap   12 (6-14)  


 


Blood Urea Nitrogen


  


  


  35 mg/dL


(7-20) 


 


 


Creatinine


  


  


  6.4 mg/dL


(0.6-1.0) 


 


 


Estimated GFR


(Cockcroft-Gault) 


  


  7.6 


  


 


 


Glucose Level


  


  


  112 mg/dL


(70-99) 


 


 


Calcium Level


  


  


  9.8 mg/dL


(8.5-10.1) 


 


 


Total Bilirubin


  


  


  0.3 mg/dL


(0.2-1.0) 


 


 


Direct Bilirubin


  


  


  0.1 mg/dL


(0.0-0.2) 


 


 


Aspartate Amino Transf


(AST/SGOT) 


  


  29 U/L (15-37) 


  


 


 


Alanine Aminotransferase


(ALT/SGPT) 


  


  < 6 U/L


(14-59) 


 


 


Alkaline Phosphatase


  


  


  199 U/L


() 


 


 


Creatine Kinase


  


  


  92 U/L


() 


 


 


Creatine Kinase MB (Mass)


  


  


  0.7 ng/mL


(0.0-3.6) 


 


 


Creatine Kinase MB Relative


Index 


  


  0.8 % (0-4) 


  


 


 


Total Protein


  


  


  7.8 g/dL


(6.4-8.2) 


 


 


Albumin


  


  


  3.3 g/dL


(3.4-5.0) 


 


 


Test


  10/18/17


07:00 10/19/17


08:15 


  


 


 


White Blood Count


  15.5 x10^3/uL


(4.0-11.0) 


  


  


 


 


Red Blood Count


  3.84 x10^6/uL


(3.50-5.40) 


  


  


 


 


Hemoglobin


  10.7 g/dL


(12.0-15.5) 10.1 g/dL


(12.0-15.5) 


  


 


 


Hematocrit


  33.9 %


(36.0-47.0) 31.7 %


(36.0-47.0) 


  


 


 


Mean Corpuscular Volume 88 fL ()    


 


Mean Corpuscular Hemoglobin 28 pg (25-35)    


 


Mean Corpuscular Hemoglobin


Concent 32 g/dL


(31-37) 32 g/dL


(31-37) 


  


 


 


Red Cell Distribution Width


  26.7 %


(11.5-14.5) 


  


  


 


 


Platelet Count


  271 x10^3/uL


(140-400) 


  


  


 


 


Neutrophils (%) (Auto) 76 % (31-73)    


 


Lymphocytes (%) (Auto) 13 % (24-48)    


 


Monocytes (%) (Auto) 7 % (0-9)    


 


Eosinophils (%) (Auto) 4 % (0-3)    


 


Basophils (%) (Auto) 1 % (0-3)    


 


Neutrophils # (Auto)


  11.8 x10^3uL


(1.8-7.7) 


  


  


 


 


Lymphocytes # (Auto)


  2.0 x10^3/uL


(1.0-4.8) 


  


  


 


 


Monocytes # (Auto)


  1.0 x10^3/uL


(0.0-1.1) 


  


  


 


 


Eosinophils # (Auto)


  0.5 x10^3/uL


(0.0-0.7) 


  


  


 


 


Basophils # (Auto)


  0.1 x10^3/uL


(0.0-0.2) 


  


  


 


 


Prothrombin Time


  39.7 SEC


(11.7-14.0) 18.7 SEC


(11.7-14.0) 


  


 


 


Prothromb Time International


Ratio 4.5 (0.8-1.1) 


  1.7 (0.8-1.1) 


  


  


 


 


Sodium Level


  136 mmol/L


(136-145) 134 mmol/L


(136-145) 


  


 


 


Potassium Level


  4.2 mmol/L


(3.5-5.1) 3.8 mmol/L


(3.5-5.1) 


  


 


 


Chloride Level


  98 mmol/L


() 97 mmol/L


() 


  


 


 


Carbon Dioxide Level


  26 mmol/L


(21-32) 28 mmol/L


(21-32) 


  


 


 


Anion Gap 12 (6-14)  9 (6-14)   


 


Blood Urea Nitrogen


  38 mg/dL


(7-20) 47 mg/dL


(7-20) 


  


 


 


Creatinine


  7.0 mg/dL


(0.6-1.0) 9.6 mg/dL


(0.6-1.0) 


  


 


 


Estimated GFR


(Cockcroft-Gault) 6.9 


  4.8 


  


  


 


 


Glucose Level


  117 mg/dL


(70-99) 91 mg/dL


(70-99) 


  


 


 


Calcium Level


  9.2 mg/dL


(8.5-10.1) 8.5 mg/dL


(8.5-10.1) 


  


 


 


Troponin I Quantitative


  0.019 ng/mL


(0.000-0.055) 


  


  


 


 


Phosphorus Level


  


  3.6 mg/dL


(2.6-4.7) 


  


 


 


Albumin


  


  2.4 g/dL


(3.4-5.0) 


  


 








Laboratory Tests








Test


  10/19/17


08:15


 


Hemoglobin


  10.1 g/dL


(12.0-15.5)


 


Hematocrit


  31.7 %


(36.0-47.0)


 


Mean Corpuscular Hemoglobin


Concent 32 g/dL


(31-37)


 


Prothrombin Time


  18.7 SEC


(11.7-14.0)


 


Prothromb Time International


Ratio 1.7 (0.8-1.1) 


 


 


Sodium Level


  134 mmol/L


(136-145)


 


Potassium Level


  3.8 mmol/L


(3.5-5.1)


 


Chloride Level


  97 mmol/L


()


 


Carbon Dioxide Level


  28 mmol/L


(21-32)


 


Anion Gap 9 (6-14) 


 


Blood Urea Nitrogen


  47 mg/dL


(7-20)


 


Creatinine


  9.6 mg/dL


(0.6-1.0)


 


Estimated GFR


(Cockcroft-Gault) 4.8 


 


 


Glucose Level


  91 mg/dL


(70-99)


 


Calcium Level


  8.5 mg/dL


(8.5-10.1)


 


Phosphorus Level


  3.6 mg/dL


(2.6-4.7)


 


Albumin


  2.4 g/dL


(3.4-5.0)








Medications





Current Medications


Ondansetron HCl (Zofran) 4 mg PRN Q8HRS  PRN IV NAUSEA/VOMITING;  Start 10/17/

17 at 18:45;  Stop 10/18/17 at 18:44;  Status DC


Phytonadione (Vitamin K Ampule) 5 mg 1X  ONCE SQ  Last administered on 10/17/

17at 19:50;  Start 10/17/17 at 19:45;  Stop 10/17/17 at 19:46;  Status DC


Acetaminophen/ Codeine Phosphate (Tylenol #3) 1 tab PRN Q8HRS  PRN PO PAIN;  

Start 10/17/17 at 19:15


Amlodipine Besylate (Norvasc) 10 mg DAILY PO ;  Start 10/18/17 at 09:00;  Stop 

10/18/17 at 09:41;  Status DC


Atenolol (Tenormin) 50 mg DAILY PO ;  Start 10/18/17 at 09:00;  Stop 10/18/17 

at 09:41;  Status DC


Atorvastatin Calcium (Lipitor) 40 mg QHS PO  Last administered on 10/18/17at 22:

07;  Start 10/17/17 at 21:00


Calcium Carbonate/ Glycine (Tums) 500 mg BID PO  Last administered on 10/18/

17at 22:07;  Start 10/17/17 at 21:00


Vitamin D (Vitamin D3) 2,000 unit DAILY PO  Last administered on 10/19/17at 08:

01;  Start 10/18/17 at 09:00


Clonidine HCl (Catapres Tts-3) 1 patch WEEKLY TD ;  Start 10/18/17 at 09:00;  

Stop 10/18/17 at 09:41;  Status DC


Darbepoetin Mo (Aranesp) 100 mcg WEEKLYHS SQ ;  Start 10/23/17 at 21:00


Diclofenac Sodium (Voltaren) 4 scarlett QID TP  Last administered on 10/19/17at 13:26

;  Start 10/17/17 at 21:00


Doxazosin Mesylate (Cardura) 4 mg DAILY PO ;  Start 10/18/17 at 09:00;  Stop 10/

18/17 at 09:41;  Status DC


Ferrous Sulfate (Feosol) 325 mg DAILY PO ;  Start 10/18/17 at 09:00


Acetaminophen/ Hydrocodone Bitart (Lortab 5/325) 1 tab PRN Q6HRS  PRN PO PAIN;  

Start 10/17/17 at 19:15


Montelukast Sodium (Singulair) 10 mg DAILY PO ;  Start 10/18/17 at 09:00


Polyethylene Glycol (miraLAX PACKET) 17 gm DAILY PO ;  Start 10/18/17 at 09:00


Non-Formulary Medication 2 puff PRN Q4-6HRS IH ;  Start 10/17/17 at 19:15;  

Stop 10/17/17 at 19:43;  Status DC


Cyclobenzaprine HCl (Flexeril) 5 mg TID PO  Last administered on 10/17/17at 23:

10;  Start 10/17/17 at 21:00;  Stop 10/18/17 at 09:41;  Status DC


Pantoprazole Sodium (Protonix) 40 mg DAILYAC PO  Last administered on 10/19/

17at 08:01;  Start 10/18/17 at 07:30


Cetirizine HCl (ZyrTEC) 10 mg DAILY PO  Last administered on 10/19/17at 08:01;  

Start 10/18/17 at 09:00


Hydralazine HCl (Apresoline) 100 mg TID PO  Last administered on 10/17/17at 23:

09;  Start 10/17/17 at 21:00


Metolazone (Zaroxolyn) 5 mg DAILY PO ;  Start 10/18/17 at 09:00


Non-Formulary Medication 2 puff BID IH ;  Start 10/17/17 at 21:00;  Stop 10/17/

17 at 21:00;  Status DC


Nystatin (Mycostatin) 1 scarlett TID TP ;  Start 10/17/17 at 21:00


Non-Formulary Medication 2 mg TID  PRN PO MUSCLE SPASMS;  Start 10/17/17 at 19:

15;  Stop 10/17/17 at 19:38;  Status DC


Oxybutynin Chloride (Ditropan) 5 mg [id number] PO  Last administered on 10/17/17at 

23:08;  Start 10/17/17 at 21:00


Hydralazine HCl (Apresoline Inj) 10 mg PRN Q4HRS  PRN IVP ELEVATED BP, SEE 

COMMENTS;  Start 10/17/17 at 19:15


Diphenhydramine HCl (Benadryl) 25 mg PRN QHS  PRN PO INSOMNIA;  Start 10/17/17 

at 19:15


Iohexol (Omnipaque 240 Mg/ml) 30 ml 1X  ONCE PO  Last administered on 10/17/

17at 19:30;  Start 10/17/17 at 19:30;  Stop 10/17/17 at 19:39;  Status DC


Albuterol Sulfate (Ventolin Neb Soln) 2.5 mg PRN Q4HRS  PRN NEB SHORTNESS OF 

BREATH;  Start 10/17/17 at 19:45


Albuterol Sulfate (Ventolin Neb Soln) 2.5 mg RTQID NEB  Last administered on 10/

19/17at 10:57;  Start 10/17/17 at 20:00


Budesonide (Pulmicort) 0.5 mg RTBID NEB  Last administered on 10/19/17at 07:23;

  Start 10/17/17 at 20:00


Phytonadione (Vitamin K Ampule) 10 mg 1X  ONCE SQ  Last administered on 10/18/

17at 10:46;  Start 10/18/17 at 10:30;  Stop 10/18/17 at 10:31;  Status DC


Metronidazole (Flagyl) 500 mg Q8HRS PO  Last administered on 10/19/17at 13:26;  

Start 10/18/17 at 22:00





Active Scripts


Active


Aranesp Syringe (Darbepoetin Mo In Polysorbat) 100 Mcg/0.5 Ml Disp.syrin 100 

Mcg SQ WEEKLYHS


Metolazone 5 Mg Tablet 5 Mg PO DAILY


Tenormin (Atenolol) 50 Mg Tablet 50 Mg PO DAILY


Reported


Calcium Acetate 667 Mg Tablet 2,001 Mg PO TIDWMEALS


Tramadol Hcl 50 Mg Tablet 50 Mg PO PRN Q6-8HRS PRN


Warfarin Sodium 5 Mg Tablet 5 Mg PO DAILY


Allegra Allergy (Fexofenadine Hcl) 180 Mg Tablet 180 Mg PO DAILY


Vitamin D3 (Cholecalciferol (Vitamin D3)) 1,000 Unit Tablet 2,000 Unit PO DAILY


Proair Hfa Inhaler (Albuterol Sulfate) 8.5 Gm Hfa.aer.ad 2 Puff IH PRN Q4-6HRS


Dulera 200 Mcg/5 Mcg Inhaler (Mometasone/Formoterol) 13 Gm Hfa.aer.ad 2 Puff IH 

BID


Esomeprazole Capsule (Esomeprazole Strontium) 40 Mg Capsule.dr 40 Mg PO DAILYAC


Voltaren (Diclofenac Sodium) 100 Gm Gel..gram. 4 Gm TP QID


Lipitor (Atorvastatin Calcium) 40 Mg Tablet 1 Tab PO QHS


Hydralazine Hcl 100 Mg Tablet 1 Tab PO TID


Vitals/I & O





Vital Sign - Last 24 Hours








 10/18/17 10/18/17 10/18/17 10/18/17





 15:00 15:23 19:00 20:00


 


Temp 97.9  97.7 





 97.9  97.7 


 


Pulse 70  66 


 


Resp 18  18 


 


B/P (MAP) 112/46 (68)  158/58 (91) 


 


Pulse Ox 95  96 


 


O2 Delivery Room Air Room Air Room Air Room Air


 


    





    





 10/18/17 10/18/17 10/18/17 10/18/17





 20:45 20:46 22:17 23:00


 


Temp    98.0





    98.0


 


Pulse    66


 


Resp    19


 


B/P (MAP)   103/41 (61) 103/46 (65)


 


Pulse Ox 99 99  92


 


O2 Delivery Room Air Room Air  Room Air


 


    





    





 10/19/17 10/19/17 10/19/17 10/19/17





 03:15 07:00 07:24 07:45


 


Temp 98.1 98.0  





 98.1 98.0  


 


Pulse 64 63  


 


Resp 18 18  


 


B/P (MAP) 93/40 (57) 122/47 (72)  


 


Pulse Ox 94 93 95 


 


O2 Delivery Room Air Room Air Room Air Room Air


 


    





    





 10/19/17   





 10:58   


 


Pulse Ox 95   


 


O2 Delivery Room Air   

















JONE HOYT MD Oct 19, 2017 13:46

## 2017-10-19 NOTE — PDOC
SUBJECTIVE


ROS


ESRD





Doign better today 





CVS:   no Orthopnea, no CP


RESP:   no SOB, no HIGH


GI:   no Nausea, no Vomiting - no further diarrhea


:   no Dysuria, no Urgency





OBJECTIVE


Vital Signs





Vital Signs








  Date Time  Temp Pulse Resp B/P (MAP) Pulse Ox O2 Delivery O2 Flow Rate FiO2


 


10/19/17 07:24     95 Room Air  


 


10/19/17 07:00 98.0 63 18 122/47 (72)    





 98.0       











PHYSICAL EXAM


Physical Exam


General Appearance:       Awake      Alert Oriented x  3   In  no  Distress


Eyes:       VIsion Unchanged Conjunctiva Normal


EN:       No EN Drainage  Mucous Memb.    mois


Neck:         no  JVD   min  JVP  Supple   no  Thyromegaly


CVS:       S1 S2   soft   Murmur  No Gallop  No Rub   tr Edema


Resp:        no  Rales   no  Rhonchi   no  Acc. Muscle use


GI:       BAS +ve    NO Bruit   Non Tender   Non Distended


:        no  CVA tenderness;    no  Suprapubic Tenderness











Assessment & Plan


ESRD:   Dialysis as below





F 180 NR 3.5 Hrs





3 K 2.5 Ca 140 Na 3 5  HC03





Qb 350 + Qd 500+





Heparin  0 Units





Uf to dry weight as tolerated





May give 25-50 gms of 25% Albumin if needed to maintain Hemodynamic stability





Treatment plan reviewed and discussed with Dialysis RN 








HypoAlb - watch trend - she tthinks she is eating better of late





Anemia: ? due to GI losses,  drops in H/H noted so restart Epogen  Transfuse  

with next HD as needed.





HTN:   Current BP meds reviewed.  See orders for changes.





Coagulopahty - can give FFp with am HD if needed





Bone & Mineral: follow Phos and later binder regimen as needed - currently well 

controlled





C. Diff +ve - ? Colitis per se





Discussed Plan of Care and prognosis etc. at length with pt





COMMENT/RELEVANT DATA


Meds





Current Medications








 Medications


  (Trade)  Dose


 Ordered  Sig/Linda  Start Time


 Stop Time Status Last Admin


Dose Admin


 


 Acetaminophen/


 Codeine Phosphate


  (Tylenol #3)  1 tab  PRN Q8HRS  PRN  10/17/17 19:15


     


 


 


 Acetaminophen/


 Hydrocodone Bitart


  (Lortab 5/325)  1 tab  PRN Q6HRS  PRN  10/17/17 19:15


     


 


 


 Albuterol Sulfate


  (Ventolin Neb


 Soln)  2.5 mg  RTQID  10/17/17 20:00


    10/19/17 07:23


2.5 MG


 


 Amlodipine


 Besylate


  (Norvasc)  10 mg  DAILY  10/18/17 09:00


 10/18/17 09:41 DC  


 


 


 Atenolol


  (Tenormin)  50 mg  DAILY  10/18/17 09:00


 10/18/17 09:41 DC  


 


 


 Atorvastatin


 Calcium


  (Lipitor)  40 mg  QHS  10/17/17 21:00


    10/18/17 22:07


40 MG


 


 Budesonide


  (Pulmicort)  0.5 mg  RTBID  10/17/17 20:00


    10/19/17 07:23


0.5 MG


 


 Calcium Carbonate/


 Glycine


  (Tums)  500 mg  BID  10/17/17 21:00


    10/18/17 22:07


500 MG


 


 Cetirizine HCl


  (ZyrTEC)  10 mg  DAILY  10/18/17 09:00


    10/19/17 08:01


10 MG


 


 Clonidine HCl


  (Catapres Tts-3)  1 patch  WEEKLY  10/18/17 09:00


 10/18/17 09:41 DC  


 


 


 Cyclobenzaprine


 HCl


  (Flexeril)  5 mg  TID  10/17/17 21:00


 10/18/17 09:41 DC 10/17/17 23:10


5 MG


 


 Darbepoetin Mo


  (Aranesp)  100 mcg  WEEKLYHS  10/23/17 21:00


     


 


 


 Diclofenac Sodium


  (Voltaren)  4 scarlett  QID  10/17/17 21:00


    10/19/17 08:01


4 SCARLETT


 


 Diphenhydramine


 HCl


  (Benadryl)  25 mg  PRN QHS  PRN  10/17/17 19:15


     


 


 


 Doxazosin Mesylate


  (Cardura)  4 mg  DAILY  10/18/17 09:00


 10/18/17 09:41 DC  


 


 


 Ferrous Sulfate


  (Feosol)  325 mg  DAILY  10/18/17 09:00


     


 


 


 Hydralazine HCl


  (Apresoline Inj)  10 mg  PRN Q4HRS  PRN  10/17/17 19:15


     


 


 


 Hydralazine HCl


  (Apresoline)  100 mg  TID  10/17/17 21:00


    10/17/17 23:09


100 MG


 


 Iohexol


  (Omnipaque 240


 Mg/ml)  30 ml  1X  ONCE  10/17/17 19:30


 10/17/17 19:39 DC 10/17/17 19:30


30 ML


 


 Metolazone


  (Zaroxolyn)  5 mg  DAILY  10/18/17 09:00


     


 


 


 Metronidazole


  (Flagyl)  500 mg  Q8HRS  10/18/17 22:00


    10/19/17 06:16


500 MG


 


 Montelukast Sodium


  (Singulair)  10 mg  DAILY  10/18/17 09:00


     


 


 


 Non-Formulary


 Medication  2 mg  TID  PRN  10/17/17 19:15


 10/17/17 19:38 DC  


 


 


 Nystatin


  (Mycostatin)  1 scarlett  TID  10/17/17 21:00


     


 


 


 Ondansetron HCl


  (Zofran)  4 mg  PRN Q8HRS  PRN  10/17/17 18:45


 10/18/17 18:44 DC  


 


 


 Oxybutynin


 Chloride


  (Ditropan)  5 mg  [id number]  10/17/17 21:00


    10/17/17 23:08


5 MG


 


 Pantoprazole


 Sodium


  (Protonix)  40 mg  DAILYAC  10/18/17 07:30


    10/19/17 08:01


40 MG


 


 Phytonadione


  (Vitamin K


 Ampule)  10 mg  1X  ONCE  10/18/17 10:30


 10/18/17 10:31 DC 10/18/17 10:46


10 MG


 


 Polyethylene


 Glycol


  (miraLAX PACKET)  17 gm  DAILY  10/18/17 09:00


     


 


 


 Vitamin D


  (Vitamin D3)  2,000 unit  DAILY  10/18/17 09:00


    10/19/17 08:01


2,000 UNIT








Lab





Laboratory Tests








Test


  10/19/17


08:15


 


Hemoglobin


  10.1 g/dL


(12.0-15.5)


 


Hematocrit


  31.7 %


(36.0-47.0)


 


Mean Corpuscular Hemoglobin


Concent 32 g/dL


(31-37)


 


Prothrombin Time


  18.7 SEC


(11.7-14.0)


 


Prothromb Time International


Ratio 1.7 (0.8-1.1) 


 


 


Sodium Level


  134 mmol/L


(136-145)


 


Potassium Level


  3.8 mmol/L


(3.5-5.1)


 


Chloride Level


  97 mmol/L


()


 


Carbon Dioxide Level


  28 mmol/L


(21-32)


 


Anion Gap 9 (6-14) 


 


Blood Urea Nitrogen


  47 mg/dL


(7-20)


 


Creatinine


  9.6 mg/dL


(0.6-1.0)


 


Estimated GFR


(Cockcroft-Gault) 4.8 


 


 


Glucose Level


  91 mg/dL


(70-99)


 


Calcium Level


  8.5 mg/dL


(8.5-10.1)


 


Phosphorus Level


  3.6 mg/dL


(2.6-4.7)


 


Albumin


  2.4 g/dL


(3.4-5.0)

















KATIE HOLLINGSWORTH MD Oct 19, 2017 10:36

## 2017-10-19 NOTE — PDOC
Subjective:


Subjective:


No further diarrhea or bleeding.


Wants to advance diet and go home after dialysis.





Objective:


Objective:


Reviewed w/ RN.


Vital Signs:





 Vital Signs








  Date Time  Temp Pulse Resp B/P (MAP) Pulse Ox O2 Delivery O2 Flow Rate FiO2


 


10/19/17 10:58     95 Room Air  


 


10/19/17 07:00 98.0 63 18 122/47 (72)    





 98.0       








Labs:





Laboratory Tests








Test


  10/19/17


08:15


 


Hemoglobin 10.1 g/dL 


 


Hematocrit 31.7 % 


 


Mean Corpuscular Hemoglobin


Concent 32 g/dL 


 


 


Prothrombin Time 18.7 SEC 


 


Prothromb Time International


Ratio 1.7 


 


 


Sodium Level 134 mmol/L 


 


Potassium Level 3.8 mmol/L 


 


Chloride Level 97 mmol/L 


 


Carbon Dioxide Level 28 mmol/L 


 


Anion Gap 9 


 


Blood Urea Nitrogen 47 mg/dL 


 


Creatinine 9.6 mg/dL 


 


Estimated GFR


(Cockcroft-Gault) 4.8 


 


 


Glucose Level 91 mg/dL 


 


Calcium Level 8.5 mg/dL 


 


Phosphorus Level 3.6 mg/dL 


 


Albumin 2.4 g/dL 











PE:





GEN: NAD


LUNGS: CTAB


HEART: RRR


ABD: non-tender


NEURO/PSYCH: A & O 3





A/P:


C Diff - on PO Flagyl





Bloody diarrhea w/ coagulopathy - improved


-on PPI for GERD, normal colon @ [facility] ~2 years ago, Hgb stable





ESRD on HD, on Warfarin (held)





--


ADAT, look to DC if tolerates.


Continue atbx for C Diff.











PASQUALE OSMAN Oct 19, 2017 11:22

## 2017-10-20 VITALS — SYSTOLIC BLOOD PRESSURE: 113 MMHG | DIASTOLIC BLOOD PRESSURE: 45 MMHG

## 2017-10-20 VITALS — DIASTOLIC BLOOD PRESSURE: 64 MMHG | SYSTOLIC BLOOD PRESSURE: 152 MMHG

## 2017-10-20 VITALS — DIASTOLIC BLOOD PRESSURE: 37 MMHG | SYSTOLIC BLOOD PRESSURE: 100 MMHG

## 2017-10-20 LAB
ANION GAP SERPL CALC-SCNC: 7 MMOL/L (ref 6–14)
BASOPHILS # BLD AUTO: 0.1 X10^3/UL (ref 0–0.2)
BASOPHILS NFR BLD: 1 % (ref 0–3)
BUN SERPL-MCNC: 27 MG/DL (ref 7–20)
CALCIUM SERPL-MCNC: 8.4 MG/DL (ref 8.5–10.1)
CHLORIDE SERPL-SCNC: 101 MMOL/L (ref 98–107)
CO2 SERPL-SCNC: 30 MMOL/L (ref 21–32)
CREAT SERPL-MCNC: 5.9 MG/DL (ref 0.6–1)
EOSINOPHIL NFR BLD: 8 % (ref 0–3)
ERYTHROCYTE [DISTWIDTH] IN BLOOD BY AUTOMATED COUNT: 27.6 % (ref 11.5–14.5)
GFR SERPLBLD BASED ON 1.73 SQ M-ARVRAT: 8.3 ML/MIN
GLUCOSE SERPL-MCNC: 102 MG/DL (ref 70–99)
HCT VFR BLD CALC: 30.8 % (ref 36–47)
HGB BLD-MCNC: 10 G/DL (ref 12–15.5)
INR PPP: 1.3 (ref 0.8–1.1)
LYMPHOCYTES # BLD: 1.5 X10^3/UL (ref 1–4.8)
LYMPHOCYTES NFR BLD AUTO: 15 % (ref 24–48)
MCH RBC QN AUTO: 29 PG (ref 25–35)
MCHC RBC AUTO-ENTMCNC: 32 G/DL (ref 31–37)
MCV RBC AUTO: 89 FL (ref 79–100)
MONOCYTES NFR BLD: 6 % (ref 0–9)
NEUTROPHILS NFR BLD AUTO: 70 % (ref 31–73)
PLATELET # BLD AUTO: 230 X10^3/UL (ref 140–400)
POTASSIUM SERPL-SCNC: 3.6 MMOL/L (ref 3.5–5.1)
PROTHROMBIN TIME: 15.8 SEC (ref 11.7–14)
RBC # BLD AUTO: 3.45 X10^6/UL (ref 3.5–5.4)
SODIUM SERPL-SCNC: 138 MMOL/L (ref 136–145)
WBC # BLD AUTO: 10.2 X10^3/UL (ref 4–11)

## 2017-10-20 RX ADMIN — OXYBUTYNIN CHLORIDE SCH MG: 5 TABLET ORAL at 09:39

## 2017-10-20 RX ADMIN — CALCIUM CARBONATE (ANTACID) CHEW TAB 500 MG SCH MG: 500 CHEW TAB at 09:39

## 2017-10-20 RX ADMIN — DICLOFENAC SODIUM SCH APP: 10 GEL TOPICAL at 09:40

## 2017-10-20 RX ADMIN — ALBUTEROL SULFATE SCH MG: 108 AEROSOL, METERED RESPIRATORY (INHALATION) at 07:23

## 2017-10-20 RX ADMIN — MONTELUKAST SODIUM SCH MG: 10 TABLET, FILM COATED ORAL at 09:40

## 2017-10-20 RX ADMIN — NYSTATIN SCH APP: 100000 CREAM TOPICAL at 09:39

## 2017-10-20 RX ADMIN — BUDESONIDE SCH MG: 0.5 INHALANT RESPIRATORY (INHALATION) at 07:23

## 2017-10-20 RX ADMIN — ALBUTEROL SULFATE SCH MG: 108 AEROSOL, METERED RESPIRATORY (INHALATION) at 11:33

## 2017-10-20 RX ADMIN — Medication SCH MG: at 09:39

## 2017-10-20 RX ADMIN — POLYETHYLENE GLYCOL 3350 SCH GM: 17 POWDER, FOR SOLUTION ORAL at 09:40

## 2017-10-20 RX ADMIN — CETIRIZINE HYDROCHLORIDE SCH MG: 10 TABLET, FILM COATED ORAL at 09:39

## 2017-10-20 RX ADMIN — VITAMIN D, TAB 1000IU (100/BT) SCH UNIT: 25 TAB at 09:39

## 2017-10-20 RX ADMIN — PANTOPRAZOLE SODIUM SCH MG: 40 TABLET, DELAYED RELEASE ORAL at 07:48

## 2017-10-20 NOTE — PDOC3
Discharge Summary [facility]


Date of Admission:  Oct 17, 2017


Discharge Date:  Oct 20, 2017


Admitting Diagnosis


 1. Hematochezia in the back ground of high INR (5)


2. COumadin toxicity 


3. Hx of clots in indwelling RT subclavian HD cath 3/2017,since then on warfarin


4, ESRD on HD TTHS


5. Left arm AV fistula site, immature yet, 3 Weeks so far


6. Anemia of CKD


7. HTN, asthma, DM, dyslipidemia - all chronic, stable


8. Obesity BMI 33.3


9. 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


cdiff +


Problems:  


Final Diagnosis





CONSULTS


renal


gi


Brief Hospital Course


Ms. Varela  is a 77 old F, on warfarin since 3/2017 for HD cath related DVT 

till now, came for bloody stool, resolved after 2nd day of admission. INR was 

found 5. 


Hb slightly dropped to 10 from 12. CT showed some diverticulosis. INR back to 

1.3 post holding warfarin and vitK.


US repeated no DVT.


she was also found + cdiff, and flagyl started, no diarrhea. cont flagyl 

xanother 10ds. 


dc home , recommend no warfarin.


dc time 35min. 








  General:  Alert, Oriented X3, Cooperative, No acute distress


Heart:  Regular rate, Normal S1, Normal S2


Lungs:  Clear


Abdomen:  Normal bowel sounds, Soft, No tenderness, No hepatosplenomegaly, No 

masses


Extremities:  No clubbing, No cyanosis, No edema, Normal pulses, No tenderness/

swelling


Skin:  No rashes, No breakdown, No significant lesion


Patient History:  


FH: cancer


  paternal aunt


Family history: Cardiovascular disease (situation)


Family history: Hypertension (situation)


Unknown


Problems:  


Disposition


home


CONDITION AT DISCHARGE:  Improved


Diet


renal


Scheduled


Albuterol Sulfate (Proair Hfa Inhaler), 2 PUFF IH PRN Q4-6HRS, (Reported)


Atenolol (Tenormin), 50 MG PO DAILY


Atorvastatin Calcium (Lipitor), 1 TAB PO QHS, (Reported)


Calcium Acetate (Calcium Acetate), 2,001 MG PO TIDWMEALS, (Reported)


Cholecalciferol (Vitamin D3) (Vitamin D3), 2,000 UNIT PO DAILY, (Reported)


Darbepoetin Mo In Polysorbat (Aranesp Syringe), 100 MCG SQ WEEKLYHS


Diclofenac Sodium (Voltaren), 4 GM TP QID, (Reported)


Esomeprazole Strontium (Esomeprazole Capsule), 40 MG PO DAILYAC, (Reported)


Ferrous Sulfate (Feosol), 325 MG PO DAILY


Fexofenadine Hcl (Allegra Allergy), 180 MG PO DAILY, (Reported)


Metolazone (Metolazone), 5 MG PO DAILY


Metronidazole (Flagyl), 500 MG PO Q8HRS


Mometasone/Formoterol (Dulera 200 Mcg/5 Mcg Inhaler), 2 PUFF IH BID, (Reported)





Scheduled PRN


Tramadol Hcl (Tramadol Hcl), 50 MG PO PRN Q6-8HRS PRN for PAIN, (Reported)





Discontinued Medications


Hydralazine Hcl (Hydralazine Hcl), 1 TAB PO TID, (Reported)


Warfarin Sodium (Warfarin Sodium), 5 MG PO DAILY, (Reported)


Follow Up


pcp next week











JONE HOYT MD Oct 20, 2017 12:22

## 2017-10-20 NOTE — PDOC
Subjective:


Subjective:


No diarrhea or bleeding.


Feels better.


Dressed to leave.





Objective:


Vital Signs:





 Vital Signs








  Date Time  Temp Pulse Resp B/P (MAP) Pulse Ox O2 Delivery O2 Flow Rate FiO2


 


10/20/17 11:34     96 Room Air  


 


10/20/17 11:00 97.9 72 19 152/64 (93)    





 97.9       








Labs:





Laboratory Tests








Test


  10/20/17


05:28


 


White Blood Count 10.2 x10^3/uL 


 


Red Blood Count 3.45 x10^6/uL 


 


Hemoglobin 10.0 g/dL 


 


Hematocrit 30.8 % 


 


Mean Corpuscular Volume 89 fL 


 


Mean Corpuscular Hemoglobin 29 pg 


 


Mean Corpuscular Hemoglobin


Concent 32 g/dL 


 


 


Red Cell Distribution Width 27.6 % 


 


Platelet Count 230 x10^3/uL 


 


Neutrophils (%) (Auto) 70 % 


 


Lymphocytes (%) (Auto) 15 % 


 


Monocytes (%) (Auto) 6 % 


 


Eosinophils (%) (Auto) 8 % 


 


Basophils (%) (Auto) 1 % 


 


Neutrophils # (Auto) 7.2 x10^3uL 


 


Lymphocytes # (Auto) 1.5 x10^3/uL 


 


Monocytes # (Auto) 0.6 x10^3/uL 


 


Eosinophils # (Auto) 0.8 x10^3/uL 


 


Basophils # (Auto) 0.1 x10^3/uL 


 


Prothrombin Time 15.8 SEC 


 


Prothromb Time International


Ratio 1.3 


 


 


Sodium Level 138 mmol/L 


 


Potassium Level 3.6 mmol/L 


 


Chloride Level 101 mmol/L 


 


Carbon Dioxide Level 30 mmol/L 


 


Anion Gap 7 


 


Blood Urea Nitrogen 27 mg/dL 


 


Creatinine 5.9 mg/dL 


 


Estimated GFR


(Cockcroft-Gault) 8.3 


 


 


Glucose Level 102 mg/dL 


 


Calcium Level 8.4 mg/dL 











PE:





GEN: NAD


LUNGS: CTAB


HEART: RRR


ABD: NABS, S/ND/NT


NEURO/PSYCH: A & O 3





A/P:


C Diff - on PO Flagyl





Bloody diarrhea w/ coagulopathy - resolved


-on PPI for GERD, normal colon @ [facility] ~2 years ago, Hgb stable


-ESRD on HD, on Warfarin 





--


Improved.


DC per primary on atbx.











PASQUALE OSMAN Oct 20, 2017 13:24

## 2018-04-13 ENCOUNTER — HOSPITAL ENCOUNTER (EMERGENCY)
Dept: HOSPITAL 61 - ER | Age: 78
Discharge: HOME | End: 2018-04-13
Payer: MEDICARE

## 2018-04-13 DIAGNOSIS — E78.00: ICD-10-CM

## 2018-04-13 DIAGNOSIS — Z88.6: ICD-10-CM

## 2018-04-13 DIAGNOSIS — N18.9: ICD-10-CM

## 2018-04-13 DIAGNOSIS — N30.00: Primary | ICD-10-CM

## 2018-04-13 DIAGNOSIS — I12.9: ICD-10-CM

## 2018-04-13 DIAGNOSIS — Z88.8: ICD-10-CM

## 2018-04-13 LAB
ADD MAN DIFF?: NO
ALBUMIN SERPL-MCNC: 3.2 G/DL (ref 3.4–5)
ALP SERPL-CCNC: 170 U/L (ref 46–116)
ALT (SGPT): 19 U/L (ref 14–59)
AMORPHOUS SEDIMENT,UR: PRESENT /HPF
ANION GAP SERPL CALC-SCNC: 3 MMOL/L (ref 6–14)
AST SERPL-CCNC: 27 U/L (ref 15–37)
BACTERIA,URINE: 0 /HPF
BASO #: 0.1 X10^3/UL (ref 0–0.2)
BASO %: 1 % (ref 0–3)
BILIRUB DIRECT SERPL-MCNC: 0.1 MG/DL (ref 0–0.2)
BILIRUBIN,URINE: (no result)
BLOOD UREA NITROGEN: 29 MG/DL (ref 7–20)
CALCIUM: 10.4 MG/DL (ref 8.5–10.1)
CHLORIDE: 101 MMOL/L (ref 98–107)
CK SERPL-CCNC: 43 U/L (ref 26–192)
CKMB INDEX: (no result) % (ref 0–4)
CKMB MASS: 0.5 NG/ML (ref 0–3.6)
CLARITY,URINE: (no result)
CO2 SERPL-SCNC: 38 MMOL/L (ref 21–32)
COLOR,URINE: YELLOW
CREAT SERPL-MCNC: 6.2 MG/DL (ref 0.6–1)
EOS #: 0.3 X10^3/UL (ref 0–0.7)
EOS %: 4 % (ref 0–3)
GFR SERPLBLD BASED ON 1.73 SQ M-ARVRAT: 7.9 ML/MIN
GLUCOSE SERPL-MCNC: 90 MG/DL (ref 70–99)
GLUCOSE,URINE: NEGATIVE MG/DL
HCG SERPL-ACNC: 8.1 X10^3/UL (ref 4–11)
HEMATOCRIT: 39.7 % (ref 36–47)
HEMOGLOBIN: 12.8 G/DL (ref 12–15.5)
INR: 2.3 (ref 0.8–1.1)
LIPASE: 107 U/L (ref 73–393)
LYMPH #: 1.8 X10^3/UL (ref 1–4.8)
LYMPH %: 22 % (ref 24–48)
MAGNESIUM: 2.1 MG/DL (ref 1.8–2.4)
MEAN CORPUSCULAR HEMOGLOBIN: 32 PG (ref 25–35)
MEAN CORPUSCULAR HGB CONC: 32 G/DL (ref 31–37)
MEAN CORPUSCULAR VOLUME: 100 FL (ref 79–100)
MONO #: 0.5 X10^3/UL (ref 0–1.1)
MONO %: 7 % (ref 0–9)
NEUT #: 5.4 X10^3UL (ref 1.8–7.7)
NEUT %: 67 % (ref 31–73)
NITRITE,URINE: NEGATIVE
NT-PRO BNP: 4423 PG/ML (ref 0–449)
PH,URINE: 6
PLATELET COUNT: 388 X10^3/UL (ref 140–400)
POTASSIUM SERPL-SCNC: 4.9 MMOL/L (ref 3.5–5.1)
PROTEIN,URINE: >=300 MG/DL
PROTHROMBIN TIME PATIENT: 24.9 SEC (ref 11.7–14)
RBC,URINE: (no result) /HPF (ref 0–2)
RED BLOOD COUNT: 3.99 X10^6/UL (ref 3.5–5.4)
RED CELL DISTRIBUTION WIDTH: 16.4 % (ref 11.5–14.5)
SODIUM: 142 MMOL/L (ref 136–145)
SPECIFIC GRAVITY,URINE: 1.01
SQUAMOUS EPITHELIAL CELL,UR: (no result) /LPF
THYROID STIM HORMONE (TSH): 1.13 UIU/ML (ref 0.36–3.74)
TOTAL BILIRUBIN: 0.5 MG/DL (ref 0.2–1)
TOTAL PROTEIN: 7.2 G/DL (ref 6.4–8.2)
TROPONINI: < 0.017 NG/ML (ref 0–0.06)
UROBILINOGEN,URINE: 0.2 MG/DL
WBC,URINE: (no result) /HPF (ref 0–4)

## 2018-04-13 PROCEDURE — 84443 ASSAY THYROID STIM HORMONE: CPT

## 2018-04-13 PROCEDURE — 93005 ELECTROCARDIOGRAM TRACING: CPT

## 2018-04-13 PROCEDURE — 84484 ASSAY OF TROPONIN QUANT: CPT

## 2018-04-13 PROCEDURE — 83735 ASSAY OF MAGNESIUM: CPT

## 2018-04-13 PROCEDURE — 83690 ASSAY OF LIPASE: CPT

## 2018-04-13 PROCEDURE — 83880 ASSAY OF NATRIURETIC PEPTIDE: CPT

## 2018-04-13 PROCEDURE — 82553 CREATINE MB FRACTION: CPT

## 2018-04-13 PROCEDURE — 80076 HEPATIC FUNCTION PANEL: CPT

## 2018-04-13 PROCEDURE — 71045 X-RAY EXAM CHEST 1 VIEW: CPT

## 2018-04-13 PROCEDURE — 36415 COLL VENOUS BLD VENIPUNCTURE: CPT

## 2018-04-13 PROCEDURE — 80048 BASIC METABOLIC PNL TOTAL CA: CPT

## 2018-04-13 PROCEDURE — 85025 COMPLETE CBC W/AUTO DIFF WBC: CPT

## 2018-04-13 PROCEDURE — 81001 URINALYSIS AUTO W/SCOPE: CPT

## 2018-04-13 PROCEDURE — 51701 INSERT BLADDER CATHETER: CPT

## 2018-04-13 PROCEDURE — 85610 PROTHROMBIN TIME: CPT

## 2018-04-13 PROCEDURE — 99285 EMERGENCY DEPT VISIT HI MDM: CPT

## 2018-04-13 PROCEDURE — 87086 URINE CULTURE/COLONY COUNT: CPT

## 2018-04-13 RX ADMIN — AMOXICILLIN AND CLAVULANATE POTASSIUM 1 TAB: 500; 125 TABLET, FILM COATED ORAL at 16:52

## 2018-10-04 ENCOUNTER — HOSPITAL ENCOUNTER (EMERGENCY)
Dept: HOSPITAL 61 - ER | Age: 78
Discharge: HOME | End: 2018-10-04
Payer: MEDICARE

## 2018-10-04 VITALS — SYSTOLIC BLOOD PRESSURE: 156 MMHG | DIASTOLIC BLOOD PRESSURE: 69 MMHG

## 2018-10-04 VITALS — HEIGHT: 64 IN | BODY MASS INDEX: 33.63 KG/M2 | WEIGHT: 197 LBS

## 2018-10-04 DIAGNOSIS — Z90.710: ICD-10-CM

## 2018-10-04 DIAGNOSIS — R51: ICD-10-CM

## 2018-10-04 DIAGNOSIS — Z88.6: ICD-10-CM

## 2018-10-04 DIAGNOSIS — I12.9: Primary | ICD-10-CM

## 2018-10-04 DIAGNOSIS — N18.9: ICD-10-CM

## 2018-10-04 DIAGNOSIS — E78.00: ICD-10-CM

## 2018-10-04 DIAGNOSIS — Z88.8: ICD-10-CM

## 2018-10-04 DIAGNOSIS — Z99.2: ICD-10-CM

## 2018-10-04 LAB
ANION GAP SERPL CALC-SCNC: 4 MMOL/L (ref 6–14)
BASOPHILS # BLD AUTO: 0 X10^3/UL (ref 0–0.2)
BASOPHILS NFR BLD: 1 % (ref 0–3)
BUN SERPL-MCNC: 36 MG/DL (ref 7–20)
CALCIUM SERPL-MCNC: 10.2 MG/DL (ref 8.5–10.1)
CHLORIDE SERPL-SCNC: 102 MMOL/L (ref 98–107)
CO2 SERPL-SCNC: 38 MMOL/L (ref 21–32)
CREAT SERPL-MCNC: 6.6 MG/DL (ref 0.6–1)
EOSINOPHIL NFR BLD: 0.2 X10^3/UL (ref 0–0.7)
EOSINOPHIL NFR BLD: 3 % (ref 0–3)
ERYTHROCYTE [DISTWIDTH] IN BLOOD BY AUTOMATED COUNT: 17.7 % (ref 11.5–14.5)
GFR SERPLBLD BASED ON 1.73 SQ M-ARVRAT: 7.3 ML/MIN
GLUCOSE SERPL-MCNC: 118 MG/DL (ref 70–99)
HCT VFR BLD CALC: 39.3 % (ref 36–47)
HGB BLD-MCNC: 13 G/DL (ref 12–15.5)
LYMPHOCYTES # BLD: 1.7 X10^3/UL (ref 1–4.8)
LYMPHOCYTES NFR BLD AUTO: 27 % (ref 24–48)
MCH RBC QN AUTO: 33 PG (ref 25–35)
MCHC RBC AUTO-ENTMCNC: 33 G/DL (ref 31–37)
MCV RBC AUTO: 98 FL (ref 79–100)
MONO #: 0.4 X10^3/UL (ref 0–1.1)
MONOCYTES NFR BLD: 7 % (ref 0–9)
NEUT #: 3.8 X10^3UL (ref 1.8–7.7)
NEUTROPHILS NFR BLD AUTO: 62 % (ref 31–73)
PLATELET # BLD AUTO: 226 X10^3/UL (ref 140–400)
POTASSIUM SERPL-SCNC: 4.3 MMOL/L (ref 3.5–5.1)
RBC # BLD AUTO: 4 X10^6/UL (ref 3.5–5.4)
SODIUM SERPL-SCNC: 144 MMOL/L (ref 136–145)
WBC # BLD AUTO: 6.2 X10^3/UL (ref 4–11)

## 2018-10-04 PROCEDURE — 93005 ELECTROCARDIOGRAM TRACING: CPT

## 2018-10-04 PROCEDURE — 85025 COMPLETE CBC W/AUTO DIFF WBC: CPT

## 2018-10-04 PROCEDURE — 71046 X-RAY EXAM CHEST 2 VIEWS: CPT

## 2018-10-04 PROCEDURE — 80048 BASIC METABOLIC PNL TOTAL CA: CPT

## 2018-10-04 PROCEDURE — 36415 COLL VENOUS BLD VENIPUNCTURE: CPT

## 2018-10-04 PROCEDURE — 84484 ASSAY OF TROPONIN QUANT: CPT

## 2018-10-04 NOTE — PHYS DOC
Past Medical History


Past Medical History:  Arthritis, High Cholesterol, Hypertension, Renal Failure


Additional Past Medical Histor:  KIDNEY DISEASE


Past Surgical History:  Hysterectomy


Additional Past Surgical Histo:  r chest dialysis cath


Alcohol Use:  Occasionally


Drug Use:  None





Adult General


Chief Complaint


Chief Complaint:  HYPERTENSION





HPI


HPI





Patient is a 78  year old female who presents with high blood pressure last 3 

days. Patient's blood pressures been running in the 130s to 150s but yesterday 

at dialysis and ran up into the 190s. Patient receives dialysis . Patient states she has she did not take any of her 

medications today because she was not home and laying at her daughter's house 

all day. Patient has no known drug allergies. Patient's primary care is Dr. Stone. Patient states she did have a headache that was 2-3 days ago. She states 

she has a slight headache now she rates about 2 or 3 but states that she feels 

like her eyes are heavy and she rates that heaviness a 9 out of 10.





Review of Systems


Review of Systems





Constitutional: Denies fever or chills []


Eyes: Denies change in visual acuity, redness, or eye pain []


HENT: Denies nasal congestion or sore throat []


Respiratory: Denies cough or shortness of breath []


Cardiovascular: No additional information not addressed in HPI []


GI: Denies abdominal pain, nausea, vomiting, bloody stools or diarrhea []


: Denies dysuria or hematuria []


Musculoskeletal: Denies back pain or joint pain []


Integument: Denies rash or skin lesions []


Neurologic: Headache. focal weakness or sensory changes []


Endocrine: Denies polyuria or polydipsia []





All other systems were reviewed and found to be within normal limits, except as 

documented in this note.





Allergies


Allergies





Allergies








Coded Allergies Type Severity Reaction Last Updated Verified


 


  NSAIDS (Non-Steroidal Anti-Inflamma Allergy Intermediate  3/16/17 Yes


 


  carvedilol Allergy Intermediate  2/16/15 Yes











Physical Exam


Physical Exam





Constitutional: Well developed, well nourished, no acute distress, non-toxic 

appearance. []


HENT: Normocephalic, atraumatic, bilateral external ears normal, oropharynx 

moist, no oral exudates, nose normal. []


Eyes: PERRLA, EOMI, conjunctiva normal, no discharge. [] 


Neck: Normal range of motion, no tenderness, supple, no stridor. [] 


Cardiovascular:Heart rate regular rhythm, no murmur []


Lungs & Thorax:  Bilateral breath sounds clear to auscultation []


Abdomen: Bowel sounds normal, soft, no tenderness, no masses, no pulsatile 

masses. [] 


Skin: Warm, dry, no erythema, no rash. [] 


Back: No tenderness, no CVA tenderness. [] 


Extremities: No tenderness, no cyanosis, no clubbing, ROM intact, no edema. [] 


Neurologic: Alert and oriented X 3, normal motor function, normal sensory 

function, no focal deficits noted. []


Psychologic: Affect normal, judgement normal, mood normal. []





Current Patient Data


Vital Signs





 Vital Signs








  Date Time  Temp Pulse Resp B/P (MAP) Pulse Ox O2 Delivery O2 Flow Rate FiO2


 


10/4/18 20:55  72 18  94   


 


10/4/18 20:35 98.4   154/82 (106)  Room Air  





 98.4       








Lab Values





 Laboratory Tests








Test


 10/4/18


21:43


 


White Blood Count


 6.2 x10^3/uL


(4.0-11.0)


 


Red Blood Count


 4.00 x10^6/uL


(3.50-5.40)


 


Hemoglobin


 13.0 g/dL


(12.0-15.5)


 


Hematocrit


 39.3 %


(36.0-47.0)


 


Mean Corpuscular Volume


 98 fL ()





 


Mean Corpuscular Hemoglobin 33 pg (25-35)  


 


Mean Corpuscular Hemoglobin


Concent 33 g/dL


(31-37)


 


Red Cell Distribution Width


 17.7 %


(11.5-14.5)  H


 


Platelet Count


 226 x10^3/uL


(140-400)


 


Neutrophils (%) (Auto) 62 % (31-73)  


 


Lymphocytes (%) (Auto) 27 % (24-48)  


 


Monocytes (%) (Auto) 7 % (0-9)  


 


Eosinophils (%) (Auto) 3 % (0-3)  


 


Basophils (%) (Auto) 1 % (0-3)  


 


Neutrophils # (Auto)


 3.8 x10^3uL


(1.8-7.7)


 


Lymphocytes # (Auto)


 1.7 x10^3/uL


(1.0-4.8)


 


Monocytes # (Auto)


 0.4 x10^3/uL


(0.0-1.1)


 


Eosinophils # (Auto)


 0.2 x10^3/uL


(0.0-0.7)


 


Basophils # (Auto)


 0.0 x10^3/uL


(0.0-0.2)


 


Sodium Level


 144 mmol/L


(136-145)


 


Potassium Level


 4.3 mmol/L


(3.5-5.1)


 


Chloride Level


 102 mmol/L


()


 


Carbon Dioxide Level


 38 mmol/L


(21-32)  H


 


Anion Gap 4 (6-14)  L


 


Blood Urea Nitrogen


 36 mg/dL


(7-20)  H


 


Creatinine


 6.6 mg/dL


(0.6-1.0)  H


 


Estimated GFR


(Cockcroft-Gault) 7.3  





 


Glucose Level


 118 mg/dL


(70-99)  H


 


Calcium Level


 10.2 mg/dL


(8.5-10.1)  H


 


Troponin I Quantitative


 < 0.017 ng/mL


(0.000-0.055)





 Laboratory Tests


10/4/18 21:43








 Laboratory Tests


10/4/18 21:43











EKG


EKG


Sinus Rhythm, no STEMI


Interpretation Time:


 and read by Dr Robles





Radiology/Procedures


Radiology/Procedures


Chest xray


Impressions:


No acute findings. See Dr Roque note. Read by Dr Robles.





 [facility]


 [address]  [place], [place] [id number] [phone]


 


 IMAGING REPORT





 Signed





PATIENT: EFREN BLACKWOOD ACCOUNT: [id number] MRN#: [id number]


: 1940 LOCATION: ER AGE: 78


SEX: F EXAM DT: 10/04/18 ACCESSION#: [id number]


STATUS: REG ER ORD. PHYSICIAN: RAMON MC 


REASON: HTN


PROCEDURE: CHEST PA & LATERAL





Chest radiograph 10/4/2018 9:29 PM


 


INDICATION: Hypertension


 


COMPARISON: 2018


 


TECHNIQUE: Frontal and lateral views of the chest are provided.


 


FINDINGS:


 


The cardiomediastinal silhouette is borderline enlarged, stable.


 


There are no pleural effusions. There is no pulmonary vascular congestion.


There is no pneumothorax.


 


The lungs are clear.


 


Right humeral head appears anteriorly subluxed in relation to the 


glenohumeral joint.


 


IMPRESSION:


 


No acute cardiopulmonary process.


 


Right humeral head appears anteriorly subluxed in relation of the 


glenohumeral joint. Correlate with range of motion.


 


Electronically signed by: Flores Cali MD (10/4/2018 10:16 PM) 


[facility]














DICTATED and SIGNED BY:     FLORES CALI MD


DATE:     10/04/18 2214








Course & Med Decision Making


Course & Med Decision Making


Patient is a 78  year old female who presents with high blood pressure last 3 

days. Patient's blood pressures been running in the 130s to 150s but yesterday 

at dialysis and ran up into the 190s. Patient receives dialysis . Patient states she has she did not take any of her 

medications today because she was not home and laying at her daughter's house 

all day. Patient has no known drug allergies. Patient's primary care is Dr. Stone. Patient states she did have a headache that was 2-3 days ago. She states 

she has a slight headache now she rates about 2 or 3 but states that she feels 

like her eyes are heavy and she rates that heaviness a 9 out of 10. Patient 

denies any numbness or tingling, chest pain or shortness of air, nausea or 

vomiting or diarrhea, dizziness or visual disturbances. Patient denies any 

weaknesses. Patient is neurologically intact. Alert and Oriented. Patient has 

no extremity swelling. NIH is negative. Currently patient's vital signs are 72 

heart rate, 95% on room air, blood pressure is 154/71. She states that she is 

not on hypertensive medication and states that she was on them a long time ago 

but they took her off of it. Pupils are equal and reactive. Upper Lungs are 

clear bilaterally to auscultation and lower lobes are diminished. Patient's EKG 

shows sinus rhythm without STEMI and was read by Dr. Robles. Chest xray 

shows no acute findings and was read by Dr Robles. Patient has a history of 

Malignant HTN, Chronic renal failure, chronic anemia, constipation, and 

arthritis. Patient is discharged home and to follow up with her primary care 

doctor to resume blood pressure medications. 





[ER physician attending note: 10:15 PM: I have reviewed the patient's testing 

and imaging. I personally examined and assessed the patient. She presented to 

the emergency department with some hypertension. She has a history of ESRD and 

does not take any antihypertensive medication at this time. Her chest x-ray 

showed cardiomegaly, with a tortuous aorta without any acute abnormality. What 

appears to be an anterior shoulder dislocation on the right was noted. However, 

the patient does have relatively full range of motion in her shoulder without 

any acute pain. She states that she has been told that she has "spurs" in her 

shoulder and is in the process of seeing an orthopedic surgeon. She is able to 

touch her left shoulder with her right arm. I discussed the chest x-ray with 

the radiologist. He states that occasionally he'll get subluxation, without 

true dislocation, and clinically on my exam that does not appear to be an 

anterior shoulder dislocation, there is no emptiness of the glenoid region. 

Given that the patient has baseline range of motion in her right shoulder I 

encouraged her to continue follow-up, and dedicated shoulder film cannot be 

obtained in the emergency department at this time as it does not appear that 

the patient has an acute shoulder dislocation, although the possibility of a 

chronic subluxation or dislocation remains present I do not think this needs 

emergent attention in the emergency department at this time.]





Dragon Disclaimer


Dragon Disclaimer


This electronic medical record was generated, in whole or in part, using a 

voice recognition dictation system.





Departure


Departure


Impression:  


 Primary Impression:  


 Hypertension


Disposition:  01 HOME, SELF-CARE


Condition:  STABLE


Referrals:  


LAYTON GAN (PCP)


Patient Instructions:  Hypertension





Additional Instructions:  


Follow up with primary care.





Problem Qualifiers








 Primary Impression:  


 Hypertension


 Hypertension type:  unspecified  Qualified Codes:  I10 - Essential (primary) 

hypertension








RAMON MC Oct 4, 2018 21:44


GINGER ROBLES MD Oct 4, 2018 22:19

## 2018-10-04 NOTE — RAD
Chest radiograph 10/4/2018 9:29 PM

 

INDICATION: Hypertension

 

COMPARISON: April 13, 2018

 

TECHNIQUE: Frontal and lateral views of the chest are provided.

 

FINDINGS:

 

The cardiomediastinal silhouette is borderline enlarged, stable.

 

There are no pleural effusions. There is no pulmonary vascular congestion.

There is no pneumothorax.

 

The lungs are clear.

 

Right humeral head appears anteriorly subluxed in relation to the 

glenohumeral joint.

 

IMPRESSION:

 

No acute cardiopulmonary process.

 

Right humeral head appears anteriorly subluxed in relation of the 

glenohumeral joint. Correlate with range of motion.

 

Electronically signed by: Lillie Faust MD (10/4/2018 10:16 PM) 

[facility]

## 2018-10-05 NOTE — EKG
[facility]

              [address] [place] [address]

Test Date:    2018-10-04               Test Time:    21:31:24

Pat Name:     EFREN BLACKWOOD             Department:   

Patient ID:   [id number]           Room:          

Gender:       F                        Technician:   

:          1940               Requested By: RAMON MC

Order Number: [id number]           Reading MD:     

                                 Measurements

Intervals                              Axis          

Rate:         70                       P:            42

LA:           172                      QRS:          -11

QRSD:         92                       T:            146

QT:           410                                    

QTc:          446                                    

                           Interpretive Statements

SINUS RHYTHM

LEFTWARD AXIS

ST & T ABNORMALITY, CONSIDER

HIGH LATERAL ISCHEMIA OR LEFT VENTRICULAR STRAIN

T ABNORMALITY IN ANTERIOR LEADS

ABNORMAL ECG

RI6.01

No previous ECG available for comparison

## 2020-06-19 ENCOUNTER — HOSPITAL ENCOUNTER (EMERGENCY)
Dept: HOSPITAL 61 - ER | Age: 80
LOS: 1 days | Discharge: HOME | End: 2020-06-20
Payer: MEDICARE

## 2020-06-19 VITALS — WEIGHT: 171.96 LBS | BODY MASS INDEX: 29.36 KG/M2 | HEIGHT: 64 IN

## 2020-06-19 DIAGNOSIS — Z79.899: ICD-10-CM

## 2020-06-19 DIAGNOSIS — I10: ICD-10-CM

## 2020-06-19 DIAGNOSIS — N18.6: ICD-10-CM

## 2020-06-19 DIAGNOSIS — Z98.890: ICD-10-CM

## 2020-06-19 DIAGNOSIS — R16.0: Primary | ICD-10-CM

## 2020-06-19 DIAGNOSIS — M19.90: ICD-10-CM

## 2020-06-19 DIAGNOSIS — K21.9: ICD-10-CM

## 2020-06-19 DIAGNOSIS — Z90.710: ICD-10-CM

## 2020-06-19 DIAGNOSIS — Z86.718: ICD-10-CM

## 2020-06-19 DIAGNOSIS — Z88.8: ICD-10-CM

## 2020-06-19 DIAGNOSIS — J45.909: ICD-10-CM

## 2020-06-19 DIAGNOSIS — E78.00: ICD-10-CM

## 2020-06-19 LAB
ALBUMIN SERPL-MCNC: 3 G/DL (ref 3.4–5)
ALBUMIN/GLOB SERPL: 0.8 {RATIO} (ref 1–1.7)
ALP SERPL-CCNC: 125 U/L (ref 46–116)
ALT SERPL-CCNC: 9 U/L (ref 14–59)
ANION GAP SERPL CALC-SCNC: 6 MMOL/L (ref 6–14)
AST SERPL-CCNC: 24 U/L (ref 15–37)
BASOPHILS # BLD AUTO: 0.1 X10^3/UL (ref 0–0.2)
BASOPHILS NFR BLD: 1 % (ref 0–3)
BILIRUB SERPL-MCNC: 0.4 MG/DL (ref 0.2–1)
BUN SERPL-MCNC: 20 MG/DL (ref 7–20)
BUN/CREAT SERPL: 4 (ref 6–20)
CALCIUM SERPL-MCNC: 8.9 MG/DL (ref 8.5–10.1)
CHLORIDE SERPL-SCNC: 98 MMOL/L (ref 98–107)
CO2 SERPL-SCNC: 35 MMOL/L (ref 21–32)
CREAT SERPL-MCNC: 5 MG/DL (ref 0.6–1)
EOSINOPHIL NFR BLD: 0.5 X10^3/UL (ref 0–0.7)
EOSINOPHIL NFR BLD: 6 % (ref 0–3)
ERYTHROCYTE [DISTWIDTH] IN BLOOD BY AUTOMATED COUNT: 18.7 % (ref 11.5–14.5)
GFR SERPLBLD BASED ON 1.73 SQ M-ARVRAT: 10 ML/MIN
GLOBULIN SER-MCNC: 4 G/DL (ref 2.2–3.8)
GLUCOSE SERPL-MCNC: 88 MG/DL (ref 70–99)
HCT VFR BLD CALC: 32.7 % (ref 36–47)
HGB BLD-MCNC: 10.8 G/DL (ref 12–15.5)
LYMPHOCYTES # BLD: 1.4 X10^3/UL (ref 1–4.8)
LYMPHOCYTES NFR BLD AUTO: 19 % (ref 24–48)
MAGNESIUM SERPL-MCNC: 1.9 MG/DL (ref 1.8–2.4)
MCH RBC QN AUTO: 32 PG (ref 25–35)
MCHC RBC AUTO-ENTMCNC: 33 G/DL (ref 31–37)
MCV RBC AUTO: 96 FL (ref 79–100)
MONO #: 0.5 X10^3/UL (ref 0–1.1)
MONOCYTES NFR BLD: 7 % (ref 0–9)
NEUT #: 4.9 X10^3/UL (ref 1.8–7.7)
NEUTROPHILS NFR BLD AUTO: 67 % (ref 31–73)
PLATELET # BLD AUTO: 229 X10^3/UL (ref 140–400)
POTASSIUM SERPL-SCNC: 3.8 MMOL/L (ref 3.5–5.1)
PROT SERPL-MCNC: 7 G/DL (ref 6.4–8.2)
RBC # BLD AUTO: 3.41 X10^6/UL (ref 3.5–5.4)
SODIUM SERPL-SCNC: 139 MMOL/L (ref 136–145)
WBC # BLD AUTO: 7.3 X10^3/UL (ref 4–11)

## 2020-06-19 PROCEDURE — 99285 EMERGENCY DEPT VISIT HI MDM: CPT

## 2020-06-19 PROCEDURE — 85025 COMPLETE CBC W/AUTO DIFF WBC: CPT

## 2020-06-19 PROCEDURE — 82553 CREATINE MB FRACTION: CPT

## 2020-06-19 PROCEDURE — 96374 THER/PROPH/DIAG INJ IV PUSH: CPT

## 2020-06-19 PROCEDURE — 80053 COMPREHEN METABOLIC PANEL: CPT

## 2020-06-19 PROCEDURE — 84484 ASSAY OF TROPONIN QUANT: CPT

## 2020-06-19 PROCEDURE — 93005 ELECTROCARDIOGRAM TRACING: CPT

## 2020-06-19 PROCEDURE — 70450 CT HEAD/BRAIN W/O DYE: CPT

## 2020-06-19 PROCEDURE — 83735 ASSAY OF MAGNESIUM: CPT

## 2020-06-19 PROCEDURE — 36415 COLL VENOUS BLD VENIPUNCTURE: CPT

## 2020-06-20 VITALS — SYSTOLIC BLOOD PRESSURE: 149 MMHG | DIASTOLIC BLOOD PRESSURE: 63 MMHG

## 2020-06-20 LAB — CK SERPL-CCNC: 138 U/L (ref 26–192)

## 2020-06-20 NOTE — RAD
CT scan of the head without contrast 6/19/2020

 

Clinical History: Headache. Hypertension

 

Technique: Unenhanced, contiguous, 5 mm axial sections were obtained 

through the head.

 

One or more of the following individualized dose reduction techniques were

utilized for this study:

 

1. Automated exposure control.

2. Adjustment of the mA and/or kV according to patient size.

3. Use of iterative reconstruction technique.

 

 

Findings: Comparison study is dated 1/3/2019.

 

There is generalized parenchymal atrophy. Areas of decreased attenuation 

are seen within the periventricular and subcortical white matter of both 

cerebral hemispheres consistent with areas of small vessel ischemic 

disease. No acute parenchymal abnormality is seen. No extra-axial fluid 

collection is noted. No skull fracture is seen.

 

Impression:  No acute intracranial abnormality is seen.

 

Electronically signed by: Julio Kingston MD (6/20/2020 12:13 AM) [id number]

## 2020-06-20 NOTE — PHYS DOC
Past Medical History


Past Medical History:  Arthritis, Asthma, DVT, GERD, High Cholesterol, 

Hypertension, Renal Failure, UTI, Other


Additional Past Medical Histor:  KIDNEY DISEASE,ESRD,BACK PAIN


Past Surgical History:  Hysterectomy, Other


Additional Past Surgical Histo:  r chest dialysis cath,R KNEE,FISTULA L UPPER 

ARM


Smoking Status:  Never Smoker


Alcohol Use:  Occasionally


Drug Use:  None





General Adult


EDM:


Chief Complaint:  HYPERTENSION





HPI:


HPI:





Patient is a 80  year old [f__sex] who presents with []





Review of Systems:


Review of Systems:


Constitutional:  Denies fever or chills. []


Eyes:  Denies change in visual acuity. []


HENT:  Denies nasal congestion or sore throat. [] 


Respiratory:  Denies cough or shortness of breath. [] 


Cardiovascular:  Denies chest pain or edema. [] 


GI:  Denies abdominal pain, nausea, vomiting, bloody stools or diarrhea. [] 


:  Denies dysuria. [] 


Musculoskeletal:  Denies back pain or joint pain. [] 


Integument:  Denies rash. [] 


Neurologic:  Denies headache, focal weakness or sensory changes. [] 


Endocrine:  Denies polyuria or polydipsia. [] 


Lymphatic:  Denies swollen glands. [] 


Psychiatric:  Denies depression or anxiety. []





Heart Score:


Risk Factors:


Risk Factors:  DM, Current or recent (<one month) smoker, HTN, HLP, family 

history of CAD, obesity.


Risk Scores:


Score 0 - 3:  2.5% MACE over next 6 weeks - Discharge Home


Score 4 - 6:  20.3% MACE over next 6 weeks - Admit for Clinical Observation


Score 7 - 10:  72.7% MACE over next 6 weeks - Early Invasive Strategies





Current Medications:





Current Medications








 Medications


  (Trade)  Dose


 Ordered  Sig/Linda  Start Time


 Stop Time Status Last Admin


Dose Admin


 


 Clonidine HCl


  (Catapres)  0.1 mg  1X  ONCE  6/19/20 23:45


 6/19/20 23:46 DC 6/19/20 23:51


0.1 MG


 


 Hydralazine HCl


  (Apresoline Inj)  10 mg  1X  ONCE  6/19/20 23:45


 6/19/20 23:46 DC 6/19/20 23:50


10 MG











Allergies:


Allergies:





Allergies








Coded Allergies Type Severity Reaction Last Updated Verified


 


  NSAIDS (Non-Steroidal Anti-Inflamma Allergy Intermediate  3/16/17 Yes


 


  carvedilol Allergy Intermediate  2/16/15 Yes











Physical Exam:


PE:





Constitutional: Well developed, well nourished, no acute distress, non-toxic 

appearance. []


HENT: Normocephalic, atraumatic, bilateral external ears normal, oropharynx 

moist, no oral exudates, nose normal. []


Eyes: PERRLA, EOMI, conjunctiva normal, no discharge. [] 


Neck: Normal range of motion, no tenderness, supple, no stridor. [] 


Cardiovascular:Heart rate regular rhythm, no murmur []


Lungs & Thorax:  Bilateral breath sounds clear to auscultation []


Abdomen: Bowel sounds normal, soft, no tenderness, no masses, no pulsatile 

masses. [] 


Skin: Warm, dry, no erythema, no rash. [] 


Back: No tenderness, no CVA tenderness. [] 


Extremities: No tenderness, no cyanosis, no clubbing, ROM intact, no edema. [] 


Neurologic: Alert and oriented X 3, normal motor function, normal sensory 

function, no focal deficits noted. []


Psychologic: Affect normal, judgement normal, mood normal. []





Current Patient Data:


Labs:





                                Laboratory Tests








Test


 6/19/20


23:34


 


White Blood Count


 7.3 x10^3/uL


(4.0-11.0)


 


Red Blood Count


 3.41 x10^6/uL


(3.50-5.40)  L


 


Hemoglobin


 10.8 g/dL


(12.0-15.5)  L


 


Hematocrit


 32.7 %


(36.0-47.0)  L


 


Mean Corpuscular Volume


 96 fL ()





 


Mean Corpuscular Hemoglobin 32 pg (25-35)  


 


Mean Corpuscular Hemoglobin


Concent 33 g/dL


(31-37)


 


Red Cell Distribution Width


 18.7 %


(11.5-14.5)  H


 


Platelet Count


 229 x10^3/uL


(140-400)


 


Neutrophils (%) (Auto) 67 % (31-73)  


 


Lymphocytes (%) (Auto) 19 % (24-48)  L


 


Monocytes (%) (Auto) 7 % (0-9)  


 


Eosinophils (%) (Auto) 6 % (0-3)  H


 


Basophils (%) (Auto) 1 % (0-3)  


 


Neutrophils # (Auto)


 4.9 x10^3/uL


(1.8-7.7)


 


Lymphocytes # (Auto)


 1.4 x10^3/uL


(1.0-4.8)


 


Monocytes # (Auto)


 0.5 x10^3/uL


(0.0-1.1)


 


Eosinophils # (Auto)


 0.5 x10^3/uL


(0.0-0.7)


 


Basophils # (Auto)


 0.1 x10^3/uL


(0.0-0.2)


 


Sodium Level


 139 mmol/L


(136-145)


 


Potassium Level


 3.8 mmol/L


(3.5-5.1)


 


Chloride Level


 98 mmol/L


()


 


Carbon Dioxide Level


 35 mmol/L


(21-32)  H


 


Anion Gap 6 (6-14)  


 


Blood Urea Nitrogen


 20 mg/dL


(7-20)


 


Creatinine


 5.0 mg/dL


(0.6-1.0)  H


 


Estimated GFR


(Cockcroft-Gault) 10.0  





 


BUN/Creatinine Ratio 4 (6-20)  L


 


Glucose Level


 88 mg/dL


(70-99)


 


Calcium Level


 8.9 mg/dL


(8.5-10.1)


 


Magnesium Level


 1.9 mg/dL


(1.8-2.4)


 


Total Bilirubin


 0.4 mg/dL


(0.2-1.0)


 


Aspartate Amino Transferase


(AST) 24 U/L (15-37)





 


Alanine Aminotransferase (ALT)


 9 U/L (14-59)


L


 


Alkaline Phosphatase


 125 U/L


()  H


 


Creatine Kinase


 138 U/L


()


 


Creatine Kinase MB (Mass)


 0.9 ng/mL


(0.0-3.6)


 


Creatine Kinase MB Relative


Index 0.7 % (0-4)  





 


Troponin I Quantitative


 0.058 ng/mL


(0.000-0.055)


 


Total Protein


 7.0 g/dL


(6.4-8.2)


 


Albumin


 3.0 g/dL


(3.4-5.0)  L


 


Albumin/Globulin Ratio


 0.8 (1.0-1.7)


L





                                Laboratory Tests


6/19/20 23:34








                                Laboratory Tests


6/19/20 23:34








Vital Signs:





                                   Vital Signs








  Date Time  Temp Pulse Resp B/P (MAP) Pulse Ox O2 Delivery O2 Flow Rate FiO2


 


6/19/20 23:51    216/84    


 


6/19/20 20:59 98.3 68 18  89 Room Air  





 98.3       











EKG:


EKG:


@2303 NSR at 70bpm, NO ST elevation, QRS 104ms, QT/QTc 496/539ms, t wave 

inversion V2-V3





Radiology/Procedures:


Radiology/Procedures:


[]





Course & Med Decision Making:


Course & Med Decision Making


Pertinent Labs and Imaging studies reviewed. (See chart for details)





[]





Dragon Disclaimer:


Dragon Disclaimer:


This electronic medical record was generated, in whole or in part, using a voice

 recognition dictation system.





Departure


Departure


Impression:  


   Primary Impression:  


   Hypertensive urgency


Disposition:  01 HOME, SELF-CARE


Condition:  STABLE


Referrals:  


LAYTON GAN (PCP)








ESTELLA YOUNG MD


Patient Instructions:  Hypertension, Easy-to-Read


Scripts


Clonidine Hcl (CLONIDINE HCL) 0.1 Mg Tablet


0.1 MG PO BID PRN for ELEVATED BP, SEE COMMENTS, #14 TAB


   Take this prescription as needed for systolic blood


   pressure (upper number) greater than 180 and/or diastolic


   blood pressure (lower number) greater than 95


   Prov: ARIEL LIMA DO         6/20/20





Justicifation of Admission Dx:


Justifications for Admission:


Justification of Admission Dx:  N/A











ARIEL LIMA DO             Jun 20, 2020 00:51

## 2020-06-20 NOTE — EKG
[facility]

              [address] [place] [address]

Test Date:    2020               Test Time:    23:03:27

Pat Name:     EFREN BLACKWOOD             Department:   

Patient ID:   [id number]           Room:          

Gender:       F                        Technician:   [id number]

:          1940               Requested By: ARIEL LIMA

Order Number: [id number]           Reading MD:     

                                 Measurements

Intervals                              Axis          

Rate:         70                       P:            -41

NM:           122                      QRS:          0

QRSD:         104                      T:            146

QT:           496                                    

QTc:          539                                    

                           Interpretive Statements

SINUS RHYTHM

ATRIAL PREMATURE COMPLEX(ES)

LEFT ATRIAL ABNORMALITY

LEFTWARD AXIS

T ABNORMALITY IN ANTEROSEPTAL LEADS

HIGH LATERAL LEADS

PROLONGED QT

ABNORMAL ECG

RI6.02

No previous ECG available for comparison

## 2020-07-12 ENCOUNTER — HOSPITAL ENCOUNTER (EMERGENCY)
Dept: HOSPITAL 61 - ER | Age: 80
Discharge: HOME | End: 2020-07-12
Payer: MEDICARE

## 2020-07-12 VITALS — HEIGHT: 63 IN | WEIGHT: 180.34 LBS | BODY MASS INDEX: 31.95 KG/M2

## 2020-07-12 VITALS — SYSTOLIC BLOOD PRESSURE: 166 MMHG | DIASTOLIC BLOOD PRESSURE: 75 MMHG

## 2020-07-12 DIAGNOSIS — J45.909: ICD-10-CM

## 2020-07-12 DIAGNOSIS — N18.9: ICD-10-CM

## 2020-07-12 DIAGNOSIS — R42: ICD-10-CM

## 2020-07-12 DIAGNOSIS — Z88.8: ICD-10-CM

## 2020-07-12 DIAGNOSIS — M19.90: ICD-10-CM

## 2020-07-12 DIAGNOSIS — Z90.710: ICD-10-CM

## 2020-07-12 DIAGNOSIS — Z98.890: ICD-10-CM

## 2020-07-12 DIAGNOSIS — K21.9: ICD-10-CM

## 2020-07-12 DIAGNOSIS — Z86.718: ICD-10-CM

## 2020-07-12 DIAGNOSIS — E78.00: ICD-10-CM

## 2020-07-12 DIAGNOSIS — I10: Primary | ICD-10-CM

## 2020-07-12 PROCEDURE — 99285 EMERGENCY DEPT VISIT HI MDM: CPT

## 2020-07-12 PROCEDURE — 71045 X-RAY EXAM CHEST 1 VIEW: CPT

## 2020-07-12 PROCEDURE — 93005 ELECTROCARDIOGRAM TRACING: CPT

## 2020-07-12 NOTE — RAD
Chest AP portable 7/12/2020.

 

Reason for exam: Shortness of air. Comparison is made with a study of 

1/3/2019.

 

A right subclavian line present on the prior study has been removed. No 

new infiltrate or effusion is seen. The cardiac contour is enlarged and 

has increased from the prior exam. Pulmonary vascularity is not grossly 

congested. Arthritic changes are again seen at the shoulders.

 

IMPRESSION: Increased size of cardiac shadow. This could be from cardiac 

enlargement or pericardial effusion.

 

Electronically signed by: Dre Godfrey Jr., MD (7/12/2020 6:10 PM) 

[facility]

## 2020-07-12 NOTE — PHYS DOC
Past Medical History


Past Medical History:  Arthritis, Asthma, DVT, GERD, High Cholesterol, Hyp

ertension, Renal Failure, UTI, Other


Additional Past Medical Histor:  KIDNEY DISEASE,ESRD,BACK PAIN


Past Surgical History:  Hysterectomy, Other


Additional Past Surgical Histo:  r chest dialysis cath,R KNEE,FISTULA L UPPER 

ARM


Smoking Status:  Never Smoker


Alcohol Use:  Occasionally


Drug Use:  None





General Adult


EDM:


Chief Complaint:  HYPERTENSION





HPI:


HPI:





Patient is a 80  year old [f__sex] who presents with []





Review of Systems:


Review of Systems:


Constitutional:   Denies fever or chills. []


Eyes:   Denies change in visual acuity. []


HENT:   Denies nasal congestion or sore throat. [] 


Respiratory:   Denies cough or shortness of breath. [] 


Cardiovascular:   Denies chest pain or edema. [] 


GI:   Denies abdominal pain, nausea, vomiting, bloody stools or diarrhea. [] 


:  Denies dysuria. [] 


Musculoskeletal:   Denies back pain or joint pain. [] 


Integument:   Denies rash. [] 


Neurologic:   Denies headache, focal weakness or sensory changes. [] 


Endocrine:   Denies polyuria or polydipsia. [] 


Lymphatic:  Denies swollen glands. [] 


Psychiatric:  Denies depression or anxiety. []





Heart Score:


Risk Factors:


Risk Factors:  DM, Current or recent (<one month) smoker, HTN, HLP, family 

history of CAD, obesity.


Risk Scores:


Score 0 - 3:  2.5% MACE over next 6 weeks - Discharge Home


Score 4 - 6:  20.3% MACE over next 6 weeks - Admit for Clinical Observation


Score 7 - 10:  72.7% MACE over next 6 weeks - Early Invasive Strategies





Allergies:


Allergies:





Allergies








Coded Allergies Type Severity Reaction Last Updated Verified


 


  NSAIDS (Non-Steroidal Anti-Inflamma Allergy Intermediate  3/16/17 Yes


 


  carvedilol Allergy Intermediate  2/16/15 Yes











Physical Exam:


PE:





Constitutional: Well developed, well nourished, no acute distress, non-toxic 

appearance. []


HENT: Normocephalic, atraumatic, bilateral external ears normal, oropharynx 

moist, no oral exudates, nose normal. []


Eyes: PERRLA, EOMI, conjunctiva normal, no discharge. [] 


Neck: Normal range of motion, no tenderness, supple, no stridor. [] 


Cardiovascular:Heart rate regular rhythm, no murmur []


Lungs & Thorax:  Bilateral breath sounds clear to auscultation []


Abdomen: Bowel sounds normal, soft, no tenderness, no masses, no pulsatile 

masses. [] 


Skin: Warm, dry, no erythema, no rash. [] 


Back: No tenderness, no CVA tenderness. [] 


Extremities: No tenderness, no cyanosis, no clubbing, ROM intact, no edema. [] 


Neurologic: Alert and oriented X 3, normal motor function, normal sensory 

function, no focal deficits noted. []


Psychologic: Affect normal, judgement normal, mood normal. []





Current Patient Data:


Vital Signs:





                                   Vital Signs








  Date Time  Temp Pulse Resp B/P (MAP) Pulse Ox O2 Delivery O2 Flow Rate FiO2


 


7/12/20 15:46 97.9 75 18 199/88 (125) 92 Room Air  





 97.9       











EKG:


EKG:


@1825 NSR at 66bpm, NO ST elevation, QRS 102ms, QT/QTc 460/484ms





Radiology/Procedures:


Radiology/Procedures:


[]





Course & Med Decision Making:


Course & Med Decision Making


Pertinent Labs and Imaging studies reviewed. (See chart for details)





[]





Dragon Disclaimer:


Dragon Disclaimer:


This electronic medical record was generated, in whole or in part, using a voice

 recognition dictation system.





Departure


Departure


Impression:  


   Primary Impression:  


   Hypertension


   Qualified Codes:  I10 - Essential (primary) hypertension


Disposition:  01 HOME, SELF-CARE


Condition:  STABLE


Referrals:  


LAYTON GAN (PCP)


Patient Instructions:  Hypertension, Easy-to-Read


Scripts


Clonidine Hcl (CLONIDINE HCL) 0.1 Mg Tablet


0.1 MG PO BID PRN for ELEVATED BP, SEE COMMENTS, #20 TAB


   Take as needed for elevated systolic (upper) blood pressure


   > 200 and/or diastolic (lower) blood pressure > 110


   Prov: ARIEL LIMA DO         7/12/20





Justicifation of Admission Dx:


Justifications for Admission:


Justification of Admission Dx:  N/A











ARIEL LIMA DO             Jul 12, 2020 16:08

## 2020-07-14 NOTE — EKG
[facility]

              [address] [place] [address]

Test Date:    2020               Test Time:    18:25:47

Pat Name:     EFREN BLACKWOOD             Department:   

Patient ID:   [id number]           Room:          

Gender:       F                        Technician:   

:          [id number]               Requested By: ARIEL LIMA

Order Number: [id number]           Reading MD:     

                                 Measurements

Intervals                              Axis          

Rate:         66                       P:            34

RI:           164                      QRS:          2

QRSD:         102                      T:            145

QT:           460                                    

QTc:          484                                    

                           Interpretive Statements

SINUS RHYTHM

VENTRICULAR PREMATURE COMPLEX(ES)

ST & T ABNORMALITY, CONSIDER

HIGH LATERAL ISCHEMIA OR LEFT VENTRICULAR STRAIN

ABNORMAL ECG

RI6.02

No previous ECG available for comparison